# Patient Record
Sex: MALE | Race: BLACK OR AFRICAN AMERICAN | NOT HISPANIC OR LATINO | Employment: FULL TIME | URBAN - METROPOLITAN AREA
[De-identification: names, ages, dates, MRNs, and addresses within clinical notes are randomized per-mention and may not be internally consistent; named-entity substitution may affect disease eponyms.]

---

## 2017-06-21 ENCOUNTER — HOSPITAL ENCOUNTER (OUTPATIENT)
Dept: MRI IMAGING | Facility: CLINIC | Age: 22
Discharge: HOME | End: 2017-06-21
Payer: COMMERCIAL

## 2017-06-21 DIAGNOSIS — G93.0 CEREBRAL CYSTS: ICD-10-CM

## 2017-06-21 PROCEDURE — A9585 GADOBUTROL INJECTION: HCPCS | Performed by: PSYCHIATRY & NEUROLOGY

## 2017-06-21 PROCEDURE — 70553 MRI BRAIN STEM W/O & W/DYE: CPT

## 2017-06-21 RX ADMIN — GADOBUTROL 8 ML: 604.72 INJECTION INTRAVENOUS at 08:54

## 2018-01-15 NOTE — RESULT NOTES
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  - Patient Instructions: To schedule this appointment, please contact Central Scheduling at [phone]  Test Name Result Flag Reference   MRI BRAIN W WO CONTRAST (Report)     MRI BRAIN WITH AND WITHOUT CONTRAST     INDICATION: Neuroepithelial cyst, history of concussion, headache, surveillance     COMPARISON: MR 3/6/2015     TECHNIQUE:   Sagittal T1, axial T2, axial FLAIR, axial T1, axial [place],    Sagittal, axial and coronal T1 postcontrast  Axial BRAVO post contrast     8 mL of Gadavist was injected intravenously without immediate consequence  IMAGE QUALITY:  Diagnostic  FINDINGS:     BRAIN PARENCHYMA: Stable small right colloidal fissure cyst, with concordant signal along long and short TR imaging  No edema/gliosis or enhancement  No ventricular obstruction, no change in size of this lesion since prior study  A focus extends up    to 13 mm in greatest linear dimension  No acute disease  Postcontrast imaging of the brain demonstrates no abnormal enhancement  VENTRICLES: Normal      SELLA AND PITUITARY GLAND: Normal      ORBITS: Normal      PARANASAL SINUSES: Normal      VASCULATURE: Evaluation of the major intracranial vasculature demonstrates appropriate flow voids  CALVARIUM AND SKULL BASE: Normal      EXTRACRANIAL SOFT TISSUES: Normal        IMPRESSION:     Stable MR appearance the brain  No acute disease  The small right choroidal fissure cyst is stable  Workstation performed: [id number]     Signed by:    Joseline Crespo MD   2/15/16

## 2019-07-28 ENCOUNTER — HOSPITAL ENCOUNTER (EMERGENCY)
Facility: HOSPITAL | Age: 24
Discharge: HOME | End: 2019-07-28
Attending: EMERGENCY MEDICINE
Payer: COMMERCIAL

## 2019-07-28 ENCOUNTER — APPOINTMENT (EMERGENCY)
Dept: RADIOLOGY | Facility: HOSPITAL | Age: 24
End: 2019-07-28
Payer: COMMERCIAL

## 2019-07-28 VITALS
OXYGEN SATURATION: 95 % | TEMPERATURE: 98.6 F | DIASTOLIC BLOOD PRESSURE: 57 MMHG | SYSTOLIC BLOOD PRESSURE: 115 MMHG | RESPIRATION RATE: 18 BRPM | BODY MASS INDEX: 25.75 KG/M2 | WEIGHT: 179.9 LBS | HEART RATE: 53 BPM | HEIGHT: 70 IN

## 2019-07-28 DIAGNOSIS — S46.001A ROTATOR CUFF INJURY, RIGHT, INITIAL ENCOUNTER: Primary | ICD-10-CM

## 2019-07-28 PROCEDURE — 99283 EMERGENCY DEPT VISIT LOW MDM: CPT | Performed by: EMERGENCY MEDICINE

## 2019-07-28 PROCEDURE — 99283 EMERGENCY DEPT VISIT LOW MDM: CPT

## 2019-07-28 PROCEDURE — 73030 X-RAY EXAM OF SHOULDER: CPT

## 2019-07-28 NOTE — ED PROVIDER NOTES
History  Chief Complaint   Patient presents with    Shoulder Injury     pt c/o injuring his right shoulder in april while playing football  c/o still having pain     HPI patient is a 23-year-old male, reports he was injured in a football game in April  Patient reports being tackled and landing on his right shoulder  Patient reports today he was moving his shoulder and felt sudden onset of pain  He reports he still having pain in the area  He complains of pain with range of motion  He reports pain when he tries to lift his right arm above his head  Patient denies any neck pain  Denies any headache  He denies any focal weakness  Denies any numbness or decreased function in his right arm  Past medical history previously healthy  Family history noncontributory  Social history, nonsmoker no history of drug abuse  None       History reviewed  No pertinent past medical history  History reviewed  No pertinent surgical history  History reviewed  No pertinent family history  I have reviewed and agree with the history as documented  Social History     Tobacco Use    Smoking status: Never Smoker    Smokeless tobacco: Never Used   Substance Use Topics    Alcohol use: Not Currently     Frequency: Never    Drug use: Not Currently        Review of Systems   Constitutional: Negative for fever  HENT: Negative for congestion  Eyes: Negative for pain and redness  Respiratory: Negative for cough and shortness of breath  Cardiovascular: Negative for chest pain  Gastrointestinal: Negative for abdominal pain and vomiting  Right shoulder pain    Physical Exam  Physical Exam   Constitutional: He is oriented to person, place, and time  He appears well-developed and well-nourished  HENT:   Head: Normocephalic  Right Ear: External ear normal    Left Ear: External ear normal    Nose: Nose normal    Mouth/Throat: Oropharynx is clear and moist    Eyes: Pupils are equal, round, and reactive to light  EOM and lids are normal    Neck: Normal range of motion  Neck supple  Pulmonary/Chest: Effort normal  No respiratory distress  Musculoskeletal: He exhibits no deformity  There is no deformity of the right shoulder there is no tenderness at the acromioclavicular joint there is no sign of dislocation  There is tenderness around the rotator cuff, patient has abduction but only to 90°, he cannot bring his arm over his head  Good distal pulses and sensation neurovascular tendon intact  Good capillary refill  Neurological: He is alert and oriented to person, place, and time  Skin: Skin is warm and dry  Psychiatric: He has a normal mood and affect  Nursing note and vitals reviewed  Vital Signs  ED Triage Vitals [07/28/19 1651]   Temperature Pulse Respirations Blood Pressure SpO2   98 6 °F (37 °C) (!) 53 18 115/57 95 %      Temp Source Heart Rate Source Patient Position - Orthostatic VS BP Location FiO2 (%)   Oral Monitor Sitting Left arm --      Pain Score       --           Vitals:    07/28/19 1651   BP: 115/57   Pulse: (!) 53   Patient Position - Orthostatic VS: Sitting         Visual Acuity      ED Medications  Medications - No data to display    Diagnostic Studies  Results Reviewed     None                 XR shoulder 2+ vw right   Final Result by Nel Villasenor MD (07/28 1838)      No acute osseous abnormality  Workstation performed: [id number]                    Procedures  Procedures       ED Course              x-ray the right shoulder shows no fracture no dislocation no bony lesions, interpreted by me I was initial   MDM medical decision making 69-year-old male presents emergency department with right shoulder pain, intermittent pain since April and then now increasing pain over the last 24 hours  Patient has tenderness over his rotator cuff, no sign of fracture on x-ray  Mechanism consistent with rotator cuff injury    Discussed outpatient treatment and follow-up discussed indications to return  Disposition  Final diagnoses:   Rotator cuff injury, right, initial encounter     Time reflects when diagnosis was documented in both MDM as applicable and the Disposition within this note     Time User Action Codes Description Comment    7/28/2019  5:27 PM Ashley Young Add [S46 001A] Rotator cuff injury, right, initial encounter       ED Disposition     ED Disposition Condition Date/Time Comment    Discharge Stable Sun Jul 28, 2019  5:27 PM Solo Amado IV discharge to home/self care  Follow-up Information     Follow up With Specialties Details Why Contact Info    [facility], DO Sports Medicine   [address] [id number] [phone]            There are no discharge medications for this patient  No discharge procedures on file      ED Provider  Electronically Signed by           Geoff Vizcarra MD  07/29/19 2585

## 2019-07-31 VITALS
WEIGHT: 177 LBS | HEART RATE: 50 BPM | HEIGHT: 70 IN | BODY MASS INDEX: 25.34 KG/M2 | SYSTOLIC BLOOD PRESSURE: 107 MMHG | DIASTOLIC BLOOD PRESSURE: 69 MMHG

## 2019-07-31 DIAGNOSIS — M25.811 CLICKING RIGHT SHOULDER: ICD-10-CM

## 2019-07-31 DIAGNOSIS — S49.91XA INJURY OF RIGHT SHOULDER, INITIAL ENCOUNTER: Primary | ICD-10-CM

## 2019-07-31 PROCEDURE — 99203 OFFICE O/P NEW LOW 30 MIN: CPT | Performed by: FAMILY MEDICINE

## 2019-07-31 NOTE — PROGRESS NOTES
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 and clicking after multiple injuries more than 3 months duration  Discussed with patient physical exam, radiographs, impression, plan  X-rays of the right shoulder are unremarkable for acute osseous abnormality  Physical exam is noted for mild tenderness biceps tendon and of trapezius on the right  He has range of motion of the shoulder limited to forward flexion of 150°, abduction 120°, and internal rotation to lumbar spine  He has normal strength of the shoulder  There is positive Russ maneuver, positive Hopkins's, positive apprehension, and positive axial load and grind  Based on his repetitive mechanism of injury and symptoms of pain and clicking, there is clinical suspicion for injury to the labrum  At this time I will refer him for MRI arthrogram to evaluate for soft tissue abnormality, as surgical intervention may be warranted  He will follow up with me after getting MRI done  Subjective:   Patient ID: Blaise Ventura is a 24 y o  male  Chief Complaint   Patient presents with    Right Shoulder - Pain       26-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 for evaluation of right shoulder pain and clicking more than 3 months duration  He reports that during a football game he abducted and extended his arm in attempt to throw the football, when he was tackled causing his arm to hyper abduct and extend with external rotation    He had pain does described as sudden onset, generalized to the shoulder worse at the lateral posterior aspect, severe intensity, radiating distally to the hand, worse with movement of the arm, associated clicking, and improved with rest   He rested and applied ice  Since then he has had persistence of pain and clicking in the shoulder worse with activity such as elevating the arm and lifting objects  Four days ago do another football game he was tackled to the arm causing exacerbation of his symptoms  He presented to the emergency room where x-ray evaluation was unremarkable for acute osseous abnormality and was suspicion for soft tissue injury  He was advised on icing and anti-inflammatories, and referred to orthopedic care  Shoulder Pain   This is a new problem  The current episode started more than 1 month ago  The problem occurs constantly  The problem has been gradually worsening  Associated symptoms include arthralgias and numbness  Pertinent negatives include no abdominal pain, chest pain, chills, fever, joint swelling, rash, sore throat or weakness  Exacerbated by: Arm use  He has tried rest, ice and NSAIDs for the symptoms  The treatment provided no relief  The following portions of the patient's history were reviewed and updated as appropriate: He  has no past medical history on file  He  has no past surgical history on file  His family history includes No Known Problems in his father and mother  He  reports that he has never smoked  He has never used smokeless tobacco  He reports that he drank alcohol  He reports that he has current or past drug history  He has No Known Allergies       Review of Systems   Constitutional: Negative for chills and fever  HENT: Negative for sore throat  Eyes: Negative for visual disturbance  Respiratory: Negative for shortness of breath  Cardiovascular: Negative for chest pain  Gastrointestinal: Negative for abdominal pain  Genitourinary: Negative for flank pain  Musculoskeletal: Positive for arthralgias  Negative for joint swelling     Skin: Negative for rash and wound  Neurological: Positive for numbness  Negative for weakness  Hematological: Does not bruise/bleed easily  Psychiatric/Behavioral: Negative for self-injury  Objective:  Vitals:    07/31/19 1032   BP: 107/69   Pulse: (!) 50   Weight: 80 3 kg (177 lb)   Height: 5' 10" (1 778 m)     Back Exam     Reflexes   Biceps: normal    Comments:    Cervical spine  -no tenderness  -normal range of motion  -negative Spurling's      Right Hand Exam     Muscle Strength   Wrist extension: 5/5   : 5/5       Left Hand Exam     Muscle Strength   Wrist extension: 5/5   :  5/5       Right Elbow Exam     Comments:  5/5 strength flexion and extension      Left Elbow Exam     Comments:  5/5 strength flexion and extension      Right Shoulder Exam     Tenderness   The patient is experiencing tenderness in the biceps tendon (Lateral subacromial, trapezius)  Range of Motion   Active abduction: 120   Forward flexion: 150   Internal rotation 0 degrees: Lumbar     Muscle Strength   Abduction: 5/5   External rotation: 5/5   Supraspinatus: 5/5   Subscapularis: 5/5   Biceps: 5/5     Tests   Apprehension: positive  Russ test: positive    Comments:  Positive [place]'s  Positive axial grind  Negative belly press  Negative push-off      Left Shoulder Exam     Muscle Strength   Abduction: 5/5           Strength/Myotome Testing     Left Wrist/Hand   Wrist extension: 5    Right Wrist/Hand   Wrist extension: 5      Physical Exam   Constitutional: He is oriented to person, place, and time  He appears well-developed  No distress  HENT:   Head: Normocephalic and atraumatic  Eyes: Conjunctivae are normal    Neck: No tracheal deviation present  Cardiovascular: Normal rate  Pulmonary/Chest: Effort normal  No respiratory distress  Abdominal: He exhibits no distension  Neurological: He is alert and oriented to person, place, and time  Skin: Skin is warm and dry  Psychiatric: He has a normal mood and affect   His behavior is normal    Nursing note and vitals reviewed  I have personally reviewed pertinent films in PACS and my interpretation is No osseous abnormality of right shoulder

## 2019-07-31 NOTE — LETTER
July 31, 2019     Leonila Velez MD  [address] [facility]    Patient: Odette Sifuentes   YOB: 1995   Date of Visit: 7/31/2019       Dear Dr Nicolas Castellanos: Thank you for referring Ja Diaz IV to me for evaluation  Below are my notes for this consultation  If you have questions, please do not hesitate to call me  I look forward to following your patient along with you  Sincerely,        [facility], DO        CC: No Recipients  [facility], DO  7/31/2019  2:32 PM  Sign at close encounter  Assessment/Plan:  Assessment/Plan   Diagnoses and all orders for this visit:    Injury of right shoulder, initial encounter  -     MRI arthrogram right shoulder; Future  -     FL injection right shoulder (arthrogram); Future    Clicking right shoulder  -     MRI arthrogram right shoulder; Future  -     FL injection right shoulder (arthrogram); Future      13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 and clicking after multiple injuries more than 3 months duration  Discussed with patient physical exam, radiographs, impression, plan  X-rays of the right shoulder are unremarkable for acute osseous abnormality  Physical exam is noted for mild tenderness biceps tendon and of trapezius on the right  He has range of motion of the shoulder limited to forward flexion of 150°, abduction 120°, and internal rotation to lumbar spine  He has normal strength of the shoulder  There is positive Russ maneuver, positive Geary's, positive apprehension, and positive axial load and grind  Based on his repetitive mechanism of injury and symptoms of pain and clicking, there is clinical suspicion for injury to the labrum  At this time I will refer him for MRI arthrogram to evaluate for soft tissue abnormality, as surgical intervention may be warranted  He will follow up with me after getting MRI done      Subjective:   Patient ID: Odette Sifuentes is a 24 y o  male  Chief Complaint   Patient presents with    Right Shoulder - Pain       44-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 for evaluation of right shoulder pain and clicking more than 3 months duration  He reports that during a football game he abducted and extended his arm in attempt to throw the football, when he was tackled causing his arm to hyper abduct and extend with external rotation  He had pain does described as sudden onset, generalized to the shoulder worse at the lateral posterior aspect, severe intensity, radiating distally to the hand, worse with movement of the arm, associated clicking, and improved with rest   He rested and applied ice  Since then he has had persistence of pain and clicking in the shoulder worse with activity such as elevating the arm and lifting objects  Four days ago do another football game he was tackled to the arm causing exacerbation of his symptoms  He presented to the emergency room where x-ray evaluation was unremarkable for acute osseous abnormality and was suspicion for soft tissue injury  He was advised on icing and anti-inflammatories, and referred to orthopedic care  Shoulder Pain   This is a new problem  The current episode started more than 1 month ago  The problem occurs constantly  The problem has been gradually worsening  Associated symptoms include arthralgias and numbness  Pertinent negatives include no abdominal pain, chest pain, chills, fever, joint swelling, rash, sore throat or weakness  Exacerbated by: Arm use  He has tried rest, ice and NSAIDs for the symptoms  The treatment provided no relief  The following portions of the patient's history were reviewed and updated as appropriate: He  has no past medical history on file  He  has no past surgical history on file  His family history includes No Known Problems in his father and mother  He  reports that he has never smoked   He has never used smokeless tobacco  He reports that he drank alcohol  He reports that he has current or past drug history  He has No Known Allergies       Review of Systems   Constitutional: Negative for chills and fever  HENT: Negative for sore throat  Eyes: Negative for visual disturbance  Respiratory: Negative for shortness of breath  Cardiovascular: Negative for chest pain  Gastrointestinal: Negative for abdominal pain  Genitourinary: Negative for flank pain  Musculoskeletal: Positive for arthralgias  Negative for joint swelling  Skin: Negative for rash and wound  Neurological: Positive for numbness  Negative for weakness  Hematological: Does not bruise/bleed easily  Psychiatric/Behavioral: Negative for self-injury  Objective:  Vitals:    07/31/19 1032   BP: 107/69   Pulse: (!) 50   Weight: 80 3 kg (177 lb)   Height: 5' 10" (1 778 m)     Back Exam     Reflexes   Biceps: normal    Comments:    Cervical spine  -no tenderness  -normal range of motion  -negative Spurling's      Right Hand Exam     Muscle Strength   Wrist extension: 5/5   : 5/5       Left Hand Exam     Muscle Strength   Wrist extension: 5/5   :  5/5       Right Elbow Exam     Comments:  5/5 strength flexion and extension      Left Elbow Exam     Comments:  5/5 strength flexion and extension      Right Shoulder Exam     Tenderness   The patient is experiencing tenderness in the biceps tendon (Lateral subacromial, trapezius)      Range of Motion   Active abduction: 120   Forward flexion: 150   Internal rotation 0 degrees: Lumbar     Muscle Strength   Abduction: 5/5   External rotation: 5/5   Supraspinatus: 5/5   Subscapularis: 5/5   Biceps: 5/5     Tests   Apprehension: positive  Russ test: positive    Comments:  Positive [place]'s  Positive axial grind  Negative belly press  Negative push-off      Left Shoulder Exam     Muscle Strength   Abduction: 5/5           Strength/Myotome Testing     Left Wrist/Hand   Wrist extension: 5    Right Wrist/Hand   Wrist extension: 5      Physical Exam   Constitutional: He is oriented to person, place, and time  He appears well-developed  No distress  HENT:   Head: Normocephalic and atraumatic  Eyes: Conjunctivae are normal    Neck: No tracheal deviation present  Cardiovascular: Normal rate  Pulmonary/Chest: Effort normal  No respiratory distress  Abdominal: He exhibits no distension  Neurological: He is alert and oriented to person, place, and time  Skin: Skin is warm and dry  Psychiatric: He has a normal mood and affect  His behavior is normal    Nursing note and vitals reviewed  I have personally reviewed pertinent films in PACS and my interpretation is No osseous abnormality of right shoulder

## 2019-08-05 ENCOUNTER — HOSPITAL ENCOUNTER (OUTPATIENT)
Dept: RADIOLOGY | Facility: HOSPITAL | Age: 24
Discharge: HOME | End: 2019-08-05
Attending: FAMILY MEDICINE
Payer: COMMERCIAL

## 2019-08-05 ENCOUNTER — HOSPITAL ENCOUNTER (OUTPATIENT)
Dept: MRI IMAGING | Facility: HOSPITAL | Age: 24
Discharge: HOME | End: 2019-08-05
Attending: FAMILY MEDICINE
Payer: COMMERCIAL

## 2019-08-05 DIAGNOSIS — M25.811 CLICKING RIGHT SHOULDER: ICD-10-CM

## 2019-08-05 DIAGNOSIS — S49.91XA INJURY OF RIGHT SHOULDER, INITIAL ENCOUNTER: ICD-10-CM

## 2019-08-05 PROCEDURE — A9585 GADOBUTROL INJECTION: HCPCS | Performed by: RADIOLOGY

## 2019-08-05 PROCEDURE — 77002 NEEDLE LOCALIZATION BY XRAY: CPT

## 2019-08-05 PROCEDURE — 73222 MRI JOINT UPR EXTREM W/DYE: CPT

## 2019-08-05 PROCEDURE — 23350 INJECTION FOR SHOULDER X-RAY: CPT

## 2019-08-05 RX ORDER — 0.9 % SODIUM CHLORIDE 0.9 %
50 VIAL (ML) INJECTION
Status: COMPLETED | OUTPATIENT
Start: 2019-08-05 | End: 2019-08-05

## 2019-08-05 RX ORDER — LIDOCAINE HYDROCHLORIDE 10 MG/ML
15 INJECTION, SOLUTION EPIDURAL; INFILTRATION; INTRACAUDAL; PERINEURAL
Status: COMPLETED | OUTPATIENT
Start: 2019-08-05 | End: 2019-08-05

## 2019-08-05 RX ADMIN — LIDOCAINE HYDROCHLORIDE 8 ML: 10 INJECTION, SOLUTION EPIDURAL; INFILTRATION; INTRACAUDAL; PERINEURAL at 14:50

## 2019-08-05 RX ADMIN — GADOBUTROL 0.2 ML: 604.72 INJECTION INTRAVENOUS at 16:01

## 2019-08-05 RX ADMIN — SODIUM CHLORIDE 12 ML: 9 INJECTION, SOLUTION INTRAMUSCULAR; INTRAVENOUS; SUBCUTANEOUS at 14:50

## 2019-08-05 RX ADMIN — IOHEXOL 3 ML: 300 INJECTION, SOLUTION INTRAVENOUS at 14:50

## 2019-08-07 VITALS
HEIGHT: 70 IN | DIASTOLIC BLOOD PRESSURE: 77 MMHG | SYSTOLIC BLOOD PRESSURE: 123 MMHG | WEIGHT: 177 LBS | HEART RATE: 60 BPM | BODY MASS INDEX: 25.34 KG/M2

## 2019-08-07 DIAGNOSIS — M25.611 SHOULDER STIFFNESS, RIGHT: ICD-10-CM

## 2019-08-07 DIAGNOSIS — Q74.0 BUFORD COMPLEX OF RIGHT SHOULDER: ICD-10-CM

## 2019-08-07 DIAGNOSIS — S43.431D LABRAL TEAR OF SHOULDER, RIGHT, SUBSEQUENT ENCOUNTER: Primary | ICD-10-CM

## 2019-08-07 DIAGNOSIS — M25.811 CLICKING RIGHT SHOULDER: ICD-10-CM

## 2019-08-07 DIAGNOSIS — M25.311 SHOULDER INSTABILITY, RIGHT: ICD-10-CM

## 2019-08-07 PROCEDURE — 99213 OFFICE O/P EST LOW 20 MIN: CPT | Performed by: FAMILY MEDICINE

## 2019-08-07 NOTE — PROGRESS NOTES
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 and clicking after multiple injuries more than 3 months duration  Discussed with patient MRI results, impression and plan MRI arthrogram of the right shoulder noted for small posterior superior glenoid labral tear, and absent anterior superior labrum and hypertrophied middle glenohumeral ligament consistent with [place] complex  Today he has range of motion with forward flexion of 170°, abduction 160°, and internal rotation to the lumbar spine  I discussed with patient that based on the morphology of his injury and variant of a shoulder surgical intervention is not recommended at this time, but we may proceed with management of formal physical therapy to which he agreed  I will refer him to physical therapy which he is to start as soon as possible and do home exercises as directed  He may take ibuprofen as needed for pain  He will follow up with me in 6 weeks at which point he will be re-evaluated  Subjective:   Patient ID: Machelle Beard is a 24 y o  male  Chief Complaint   Patient presents with    Right Shoulder - Pain, Follow-up       43-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 following up for right shoulder pain and clicking more than 3 months duration following multiple injuries  He was last seen 1 week ago at which point he was referred for MRI arthrogram of the shoulder    Today reports mild improvement stating he has improved range of motion of the shoulder  He has pain described as generalized to the shoulder but worse at the lateral posterior aspect, fluctuating in intensity, intermittent, radiating distally to the hand, worse with movement of the arm and strenuous activity, associated with clicking, and improved with rest   He has not needed to take medications for pain  He has not had any new injury since his last visit  Shoulder Pain   This is a new problem  The current episode started more than 1 month ago  The problem occurs intermittently  The problem has been gradually improving  Associated symptoms include arthralgias  Pertinent negatives include no joint swelling, numbness or weakness  Exacerbated by: Physical activity  He has tried rest, ice and NSAIDs for the symptoms  The treatment provided mild relief  Review of Systems   Musculoskeletal: Positive for arthralgias  Negative for joint swelling  Neurological: Negative for weakness and numbness  Objective:  Vitals:    08/07/19 1110   BP: 123/77   Pulse: 60   Weight: 80 3 kg (177 lb)   Height: 5' 10" (1 778 m)     Right Shoulder Exam     Range of Motion   Active abduction: 160   Forward flexion: 170   Internal rotation 0 degrees: Lumbar             Physical Exam   Constitutional: He is oriented to person, place, and time  He appears well-developed  No distress  HENT:   Head: Normocephalic and atraumatic  Eyes: Conjunctivae are normal    Neck: No tracheal deviation present  Cardiovascular: Normal rate  Pulmonary/Chest: Effort normal  No respiratory distress  Abdominal: He exhibits no distension  Neurological: He is alert and oriented to person, place, and time  Skin: Skin is warm and dry  Psychiatric: He has a normal mood and affect  His behavior is normal    Nursing note and vitals reviewed        I have personally reviewed pertinent films in PACS and my interpretation is No rotator cuff tear  Small labral tear

## 2019-08-20 ENCOUNTER — EVALUATION (OUTPATIENT)
Dept: PHYSICAL THERAPY | Facility: CLINIC | Age: 24
End: 2019-08-20
Payer: COMMERCIAL

## 2019-08-20 DIAGNOSIS — M25.811 CLICKING RIGHT SHOULDER: ICD-10-CM

## 2019-08-20 DIAGNOSIS — Q74.0 BUFORD COMPLEX OF RIGHT SHOULDER: ICD-10-CM

## 2019-08-20 DIAGNOSIS — M25.611 SHOULDER STIFFNESS, RIGHT: ICD-10-CM

## 2019-08-20 DIAGNOSIS — S43.431D LABRAL TEAR OF SHOULDER, RIGHT, SUBSEQUENT ENCOUNTER: ICD-10-CM

## 2019-08-20 DIAGNOSIS — M25.311 SHOULDER INSTABILITY, RIGHT: ICD-10-CM

## 2019-08-20 DIAGNOSIS — M25.511 ACUTE PAIN OF RIGHT SHOULDER: Primary | ICD-10-CM

## 2019-08-20 PROCEDURE — 97110 THERAPEUTIC EXERCISES: CPT | Performed by: PHYSICAL THERAPIST

## 2019-08-20 PROCEDURE — 97161 PT EVAL LOW COMPLEX 20 MIN: CPT | Performed by: PHYSICAL THERAPIST

## 2019-08-20 PROCEDURE — 97112 NEUROMUSCULAR REEDUCATION: CPT | Performed by: PHYSICAL THERAPIST

## 2019-08-20 NOTE — PROGRESS NOTES
PT Evaluation     Today's date: 2019  Patient name: Obey Swift  : 1995  MRN: [id number]  Referring provider: Ricardo Ward DO  Dx:   Encounter Diagnosis     ICD-10-CM    1  Acute pain of right shoulder M25 511    2  Labral tear of shoulder, right, subsequent encounter S43 431D Ambulatory referral to Physical Therapy   3  [place] complex of right shoulder Q74 0 Ambulatory referral to Physical Therapy   4  Shoulder instability, right M25 311 Ambulatory referral to Physical Therapy   5  Clicking right shoulder M25 811 Ambulatory referral to Physical Therapy   6  Shoulder stiffness, right M25 611 Ambulatory referral to Physical Therapy       Start Time: 1600  Stop Time: 1700  Total time in clinic (min): 60 minutes    Assessment  Assessment details: Pt is a 24 y o  Male with R shoulder pain following a hit during football one month ago  He is limited in his R shoulder ROM, limited scapular stabilizer strength and R protracted scapula  He has signs and symptoms suggestive of scapular dyskinesis     Impairments: abnormal or restricted ROM, impaired physical strength, pain with function, scapular dyskinesis and poor posture     Symptom irritability: low  Goals  4 weeks  Full R shoulder ROM  Decrease verbal pain rating with R shoulder abduction by 50%  8 weeks  Increase scapular stabilizer muscle strength to symmetrical with L side  Be able to throw a football 100 feet without increase in pain of the R shoulder    Plan  Patient would benefit from: skilled physical therapy  Planned modality interventions: cryotherapy and ultrasound  Planned therapy interventions: abdominal trunk stabilization, functional ROM exercises, home exercise program, therapeutic exercise, therapeutic activities, stretching, strengthening, patient education, manual therapy, postural training and flexibility  Frequency: 2x week  Duration in weeks: 8  Treatment plan discussed with: patient        Subjective Evaluation    History of Present Illness  Date of onset: 2019  Mechanism of injury: trauma  Mechanism of injury: Irritated it playing football than one month ago he was hit while playing and felt a pop in his R shoulder  Quality of life: good    Pain  Current pain ratin  At best pain ratin  At worst pain ratin  Location: R shoulder  Quality: sharp  Relieving factors: rest  Aggravating factors: overhead activity      Diagnostic Tests  MRI studies: abnormal  Treatments  No previous or current treatments  Patient Goals  Patient goals for therapy: decreased pain, return to sport/leisure activities and increased motion          Objective     Postural Observations  Seated posture: fair  Standing posture: fair  Correction of posture: has no consistent effect    Additional Postural Observation Details  Protracted R scapula, decreased lumbar lordosis and thoracic kyphosis    Palpation     Right   No palpable tenderness to the biceps  Muscle spasm in the upper trapezius       Cervical/Thoracic Screen   Cervical range of motion within normal limits    Neurological Testing     Sensation     Shoulder   Left Shoulder   Intact: light touch    Right Shoulder   Intact: light touch    Active Range of Motion   Left Shoulder   Normal active range of motion  Flexion: 160 degrees     Right Shoulder   Flexion: 160 degrees   Abduction: 90 degrees   External rotation 90°: 10 degreeswith pain  Internal rotation BTB: WFL    Passive Range of Motion   Left Shoulder   Normal passive range of motion    Right Shoulder   Flexion: WFL  Abduction: 150 degrees   External rotation 90°: WFL  Internal rotation 90°: WFL    Scapular Mobility   Left Shoulder   Scapular Dyskinesis: none    Right Shoulder   Scapular Dyskinesis: grade I  Scapular Mobility with Shoulder to 90° FF   Scapular winging: minimal    Scapular Mobility beyond 90° FF   Scapular winging: minimal    Strength/Myotome Testing     Left Shoulder   Normal muscle strength    Right Shoulder Isolated Muscles   Latissimus: 4-   Lower trapezius: 4-   Middle trapezius: 4-   Rhomboids: 4-     Additional Strength Details  R gross shoulder strength normal expect those noted above  Tests     Right Shoulder   Positive Hawkin's, Neer's and painful arc  Negative clunk                Precautions: Slight labral tear in R shoulder      Manual              IASTM, MFR, jt mob                                                                     Exercise Diary              Planks   (Elbows -->  High)             Supine ER             Supine IYT             Pulleys             Elliptical pulling             Cross body pull             Wall snow angels             Ball pushups             Ball posture             Ball back ext             Mirant UE/LE             yoga                                                                                                            Modalities

## 2019-08-20 NOTE — LETTER
2019    Aki Dean DO  [address]    Patient: Ami Malik IV   YOB: 1995   Date of Visit: 2019     Encounter Diagnosis     ICD-10-CM    1  Acute pain of right shoulder M25 511    2  Labral tear of shoulder, right, subsequent encounter S43 431D Ambulatory referral to Physical Therapy   3  [place] complex of right shoulder Q74 0 Ambulatory referral to Physical Therapy   4  Shoulder instability, right M25 311 Ambulatory referral to Physical Therapy   5  Clicking right shoulder M25 811 Ambulatory referral to Physical Therapy   6  Shoulder stiffness, right M25 611 Ambulatory referral to Physical Therapy       Dear Dr Zeenat Tejeda: Thank you for your recent referral of Ami Malik IV  Please review the attached evaluation summary from Deven's recent visit  Please verify that you agree with the plan of care by signing the attached order  If you have any questions or concerns, please do not hesitate to call  I sincerely appreciate the opportunity to share in the care of one of your patients and hope to have another opportunity to work with you in the near future  Sincerely,    Dana Fall, PT      Referring Provider:      I certify that I have read the below Plan of Care and certify the need for these services furnished under this plan of treatment while under my care  Aki Dean DO  [address] [address]  [place]          PT Evaluation     Today's date: 2019  Patient name: Rhonda Mares  : 1995  MRN: [id number]  Referring provider: Marnie Haney DO  Dx:   Encounter Diagnosis     ICD-10-CM    1  Acute pain of right shoulder M25 511    2  Labral tear of shoulder, right, subsequent encounter S43 431D Ambulatory referral to Physical Therapy   3  [place] complex of right shoulder Q74 0 Ambulatory referral to Physical Therapy   4   Shoulder instability, right M25 311 Ambulatory referral to Physical Therapy   5  Clicking right shoulder M25 811 Ambulatory referral to Physical Therapy   6  Shoulder stiffness, right M25 611 Ambulatory referral to Physical Therapy       Start Time: 1600  Stop Time: 1700  Total time in clinic (min): 60 minutes    Assessment  Assessment details: Pt is a 24 y o  Male with R shoulder pain following a hit during football one month ago  He is limited in his R shoulder ROM, limited scapular stabilizer strength and R protracted scapula  He has signs and symptoms suggestive of scapular dyskinesis  Impairments: abnormal or restricted ROM, impaired physical strength, pain with function, scapular dyskinesis and poor posture     Symptom irritability: low  Goals  4 weeks  Full R shoulder ROM  Decrease verbal pain rating with R shoulder abduction by 50%  8 weeks  Increase scapular stabilizer muscle strength to symmetrical with L side  Be able to throw a football 100 feet without increase in pain of the R shoulder    Plan  Patient would benefit from: skilled physical therapy  Planned modality interventions: cryotherapy and ultrasound  Planned therapy interventions: abdominal trunk stabilization, functional ROM exercises, home exercise program, therapeutic exercise, therapeutic activities, stretching, strengthening, patient education, manual therapy, postural training and flexibility  Frequency: 2x week  Duration in weeks: 8  Treatment plan discussed with: patient        Subjective Evaluation    History of Present Illness  Date of onset: 2019  Mechanism of injury: trauma  Mechanism of injury: Irritated it playing football than one month ago he was hit while playing and felt a pop in his R shoulder     Quality of life: good    Pain  Current pain ratin  At best pain ratin  At worst pain ratin  Location: R shoulder  Quality: sharp  Relieving factors: rest  Aggravating factors: overhead activity      Diagnostic Tests  MRI studies: abnormal  Treatments  No previous or current treatments  Patient Goals  Patient goals for therapy: decreased pain, return to sport/leisure activities and increased motion          Objective     Postural Observations  Seated posture: fair  Standing posture: fair  Correction of posture: has no consistent effect    Additional Postural Observation Details  Protracted R scapula, decreased lumbar lordosis and thoracic kyphosis    Palpation     Right   No palpable tenderness to the biceps  Muscle spasm in the upper trapezius  Cervical/Thoracic Screen   Cervical range of motion within normal limits    Neurological Testing     Sensation     Shoulder   Left Shoulder   Intact: light touch    Right Shoulder   Intact: light touch    Active Range of Motion   Left Shoulder   Normal active range of motion  Flexion: 160 degrees     Right Shoulder   Flexion: 160 degrees   Abduction: 90 degrees   External rotation 90°:  10 degreeswith pain  Internal rotation BTB: WFL    Passive Range of Motion   Left Shoulder   Normal passive range of motion    Right Shoulder   Flexion: WFL  Abduction: 150 degrees   External rotation 90°:  WFL  Internal rotation 90°:  WFL    Scapular Mobility   Left Shoulder   Scapular Dyskinesis: none    Right Shoulder   Scapular Dyskinesis: grade I  Scapular Mobility with Shoulder to 90° FF   Scapular winging: minimal    Scapular Mobility beyond 90° FF   Scapular winging: minimal    Strength/Myotome Testing     Left Shoulder   Normal muscle strength    Right Shoulder     Isolated Muscles   Latissimus: 4-   Lower trapezius: 4-   Middle trapezius: 4-   Rhomboids: 4-     Additional Strength Details  R gross shoulder strength normal expect those noted above  Tests     Right Shoulder   Positive Hawkin's, Neer's and painful arc  Negative clunk                Precautions: Slight labral tear in R shoulder      Manual              IASTM, MFR, jt mob Exercise Diary              Planks   (Elbows -->  High)             Supine ER             Supine IYT             Pulleys             Elliptical pulling             Cross body pull             Wall snow angels             Ball pushups             Ball posture             Ball back ext             Mirant UE/LE             yoga                                                                                                            Modalities

## 2019-08-20 NOTE — LETTER
2019    Bishop Ivory DO  [address]    Patient: Darylene Jack IV   YOB: 1995   Date of Visit: 2019     Encounter Diagnosis     ICD-10-CM    1  Acute pain of right shoulder M25 511    2  Labral tear of shoulder, right, subsequent encounter S43 431D Ambulatory referral to Physical Therapy   3  [place] complex of right shoulder Q74 0 Ambulatory referral to Physical Therapy   4  Shoulder instability, right M25 311 Ambulatory referral to Physical Therapy   5  Clicking right shoulder M25 811 Ambulatory referral to Physical Therapy   6  Shoulder stiffness, right M25 611 Ambulatory referral to Physical Therapy       Dear Dr Regino Rios: Thank you for your recent referral of Darylene Jack IV  Please review the attached evaluation summary from Deven's recent visit  Please verify that you agree with the plan of care by signing the attached order  If you have any questions or concerns, please do not hesitate to call  I sincerely appreciate the opportunity to share in the care of one of your patients and hope to have another opportunity to work with you in the near future  Sincerely,    Sharlyn Mortimer, PT      Referring Provider:      I certify that I have read the below Plan of Care and certify the need for these services furnished under this plan of treatment while under my care  Bishop Ivory DO  [address] [address]  [place]          PT Evaluation     Today's date: 2019  Patient name: Blaise Ventura  : 1995  MRN: [id number]  Referring provider: Aleta Shipman DO  Dx:   Encounter Diagnosis     ICD-10-CM    1  Acute pain of right shoulder M25 511    2  Labral tear of shoulder, right, subsequent encounter S43 431D Ambulatory referral to Physical Therapy   3  [place] complex of right shoulder Q74 0 Ambulatory referral to Physical Therapy   4   Shoulder instability, right M25 311 Ambulatory referral to Physical Therapy   5  Clicking right shoulder M25 811 Ambulatory referral to Physical Therapy   6  Shoulder stiffness, right M25 611 Ambulatory referral to Physical Therapy       Start Time: 1600  Stop Time: 1700  Total time in clinic (min): 60 minutes    Assessment  Assessment details: Pt is a 24 y o  Male with R shoulder pain following a hit during football one month ago  He is limited in his R shoulder ROM, limited scapular stabilizer strength and R protracted scapula  He has signs and symptoms suggestive of scapular dyskinesis  Impairments: abnormal or restricted ROM, impaired physical strength, pain with function, scapular dyskinesis and poor posture     Symptom irritability: low  Goals  4 weeks  Full R shoulder ROM  Decrease verbal pain rating with R shoulder abduction by 50%  8 weeks  Increase scapular stabilizer muscle strength to symmetrical with L side  Be able to throw a football 100 feet without increase in pain of the R shoulder    Plan  Patient would benefit from: skilled physical therapy  Planned modality interventions: cryotherapy and ultrasound  Planned therapy interventions: abdominal trunk stabilization, functional ROM exercises, home exercise program, therapeutic exercise, therapeutic activities, stretching, strengthening, patient education, manual therapy, postural training and flexibility  Frequency: 2x week  Duration in weeks: 8  Treatment plan discussed with: patient        Subjective Evaluation    History of Present Illness  Date of onset: 2019  Mechanism of injury: trauma  Mechanism of injury: Irritated it playing football than one month ago he was hit while playing and felt a pop in his R shoulder     Quality of life: good    Pain  Current pain ratin  At best pain ratin  At worst pain ratin  Location: R shoulder  Quality: sharp  Relieving factors: rest  Aggravating factors: overhead activity      Diagnostic Tests  MRI studies: abnormal  Treatments  No previous or current treatments  Patient Goals  Patient goals for therapy: decreased pain, return to sport/leisure activities and increased motion          Objective     Postural Observations  Seated posture: fair  Standing posture: fair  Correction of posture: has no consistent effect    Additional Postural Observation Details  Protracted R scapula, decreased lumbar lordosis and thoracic kyphosis    Palpation     Right   No palpable tenderness to the biceps  Muscle spasm in the upper trapezius  Cervical/Thoracic Screen   Cervical range of motion within normal limits    Neurological Testing     Sensation     Shoulder   Left Shoulder   Intact: light touch    Right Shoulder   Intact: light touch    Active Range of Motion   Left Shoulder   Normal active range of motion  Flexion: 160 degrees     Right Shoulder   Flexion: 160 degrees   Abduction: 90 degrees   External rotation 90°:  10 degreeswith pain  Internal rotation BTB: WFL    Passive Range of Motion   Left Shoulder   Normal passive range of motion    Right Shoulder   Flexion: WFL  Abduction: 150 degrees   External rotation 90°:  WFL  Internal rotation 90°:  WFL    Scapular Mobility   Left Shoulder   Scapular Dyskinesis: none    Right Shoulder   Scapular Dyskinesis: grade I  Scapular Mobility with Shoulder to 90° FF   Scapular winging: minimal    Scapular Mobility beyond 90° FF   Scapular winging: minimal    Strength/Myotome Testing     Left Shoulder   Normal muscle strength    Right Shoulder     Isolated Muscles   Latissimus: 4-   Lower trapezius: 4-   Middle trapezius: 4-   Rhomboids: 4-     Additional Strength Details  R gross shoulder strength normal expect those noted above  Tests     Right Shoulder   Positive Hawkin's, Neer's and painful arc  Negative clunk                Precautions: Slight labral tear in R shoulder      Manual Exercise Diary                                                                                                                                                                                                                                                                                      Modalities

## 2019-08-22 ENCOUNTER — OFFICE VISIT (OUTPATIENT)
Dept: PHYSICAL THERAPY | Facility: CLINIC | Age: 24
End: 2019-08-22
Payer: COMMERCIAL

## 2019-08-22 DIAGNOSIS — M25.811 CLICKING RIGHT SHOULDER: ICD-10-CM

## 2019-08-22 DIAGNOSIS — M25.311 SHOULDER INSTABILITY, RIGHT: ICD-10-CM

## 2019-08-22 DIAGNOSIS — S43.431D LABRAL TEAR OF SHOULDER, RIGHT, SUBSEQUENT ENCOUNTER: ICD-10-CM

## 2019-08-22 DIAGNOSIS — M25.511 ACUTE PAIN OF RIGHT SHOULDER: Primary | ICD-10-CM

## 2019-08-22 DIAGNOSIS — Q74.0 BUFORD COMPLEX OF RIGHT SHOULDER: ICD-10-CM

## 2019-08-22 DIAGNOSIS — M25.611 SHOULDER STIFFNESS, RIGHT: ICD-10-CM

## 2019-08-22 PROCEDURE — 97110 THERAPEUTIC EXERCISES: CPT | Performed by: PHYSICAL THERAPIST

## 2019-08-22 PROCEDURE — 97140 MANUAL THERAPY 1/> REGIONS: CPT | Performed by: PHYSICAL THERAPIST

## 2019-08-22 PROCEDURE — 97112 NEUROMUSCULAR REEDUCATION: CPT | Performed by: PHYSICAL THERAPIST

## 2019-08-22 NOTE — PROGRESS NOTES
Daily Note     Today's date: 2019  Patient name: Emmett Vu  : 1995  MRN: [id number]  Referring provider: Katelyn Shah DO  Dx:   Encounter Diagnosis     ICD-10-CM    1  Acute pain of right shoulder M25 511    2  Labral tear of shoulder, right, subsequent encounter S43 431D    3  [place] complex of right shoulder Q74 0    4  Shoulder instability, right M25 311    5  Clicking right shoulder M25 811    6  Shoulder stiffness, right M25 611        Start Time: 1600  Stop Time: 1650  Total time in clinic (min): 50 minutes    Subjective: Pt reports at start of session no pain in the shoulder  Objective: See treatment diary below      Assessment: Pt tolerated most exercises today with minimal increase in shoulder pain  He was unable to complete low plank on elbows due to pain in the shoulder after one repetition  He was able to maintain his posture and scapular position throughout exercises today with minimal verbal and tactile cueing  He is highly motivated to return to training for football and understands the limitations given to him currently as well as the importance with compliance with HEP in order to return to sport  He continues to need PT to increase R shoulder ROM and increase scapular stabilizer muscle strength in order to decrease the shoulder pain for return to throwing in football  Plan: Continue per plan of care  Progress treatment as tolerated  Precautions: Slight labral tear in R shoulder      Manual              IASTM, MFR, jt mob 10'                                                                    Exercise Diary              Planks (elbow) Def              Prone ER 2x15 2#            Pulleys (flex/abd) 2x20 ea            Elliptical pulling 5'            Wall snow angels 3x30"  HEP            Ball posture 2x20            Pec Minor Stretch Doorway 5x10"            Quad UE/LE 2x20 ea            yoga             Supine IYT             Cross body pull Ball back ext             [employer]

## 2019-08-27 ENCOUNTER — OFFICE VISIT (OUTPATIENT)
Dept: PHYSICAL THERAPY | Facility: CLINIC | Age: 24
End: 2019-08-27
Payer: COMMERCIAL

## 2019-08-27 DIAGNOSIS — M25.511 ACUTE PAIN OF RIGHT SHOULDER: Primary | ICD-10-CM

## 2019-08-27 DIAGNOSIS — M25.611 SHOULDER STIFFNESS, RIGHT: ICD-10-CM

## 2019-08-27 DIAGNOSIS — M25.311 SHOULDER INSTABILITY, RIGHT: ICD-10-CM

## 2019-08-27 DIAGNOSIS — S43.431D LABRAL TEAR OF SHOULDER, RIGHT, SUBSEQUENT ENCOUNTER: ICD-10-CM

## 2019-08-27 DIAGNOSIS — M25.811 CLICKING RIGHT SHOULDER: ICD-10-CM

## 2019-08-27 DIAGNOSIS — Q74.0 BUFORD COMPLEX OF RIGHT SHOULDER: ICD-10-CM

## 2019-08-27 PROCEDURE — 97110 THERAPEUTIC EXERCISES: CPT | Performed by: PHYSICAL THERAPIST

## 2019-08-27 PROCEDURE — 97112 NEUROMUSCULAR REEDUCATION: CPT | Performed by: PHYSICAL THERAPIST

## 2019-08-27 NOTE — PROGRESS NOTES
Daily Note     Today's date: 2019  Patient name: Machelle Beard  : 1995  MRN: [id number]  Referring provider: Angie Faustin DO  Dx:   Encounter Diagnosis     ICD-10-CM    1  Acute pain of right shoulder M25 511    2  Labral tear of shoulder, right, subsequent encounter S43 431D    3  [place] complex of right shoulder Q74 0    4  Shoulder instability, right M25 311    5  Clicking right shoulder M25 811    6  Shoulder stiffness, right M25 611                   Subjective: Pt notes some improvement since last session, reports decreased pain with motion  Objective: See treatment diary below      Assessment: Pt completed exercise with correct technique and demonstrated moderate fatigue after exercise  Tolerated progressions of scapular and shoulder strengthening exercises without reports of increased shoulder pain, but did report minor soreness during RS @ 90 abd that subsided afterwards  Serratus TB punch reproduced some symptoms, second set deferred  Pt demonstrated good motor control during exercise  Pt would benefit from continued PT services to reduce pain and increase level of function  Plan: Continue per plan of care  Progress treatment as tolerated  Precautions: Slight labral tear in R shoulder      Manual             IASTM, MFR, jt mob 10' 5' mobs           RS @ 90 flex  3x30"           RS @ 90 abd, neutral rotation, slighty off table  2x30"                                         Exercise Diary             Planks (elbow) Def              Prone ER 2x15 2# 2#, 2x15           Pulleys (flex/abd) 2x20 ea            Elliptical pulling 5'            Wall snow angels 3x30"  HEP -           Ball posture 2x20            Pec Minor Stretch Doorway 5x10" HEP           Quad UE/LE 2x20 ea            yoga             Prone IYT  2x15, 2#           Cross body pull             Ball back ext             Ball pushups             TB serratus punch  BTB, 15x3"           Iso ER wall slide  3x8x2", BTB           TB IR  W/ step out, BTB 3x12           [place]  3#, 2x20           Ball on wall  ABC 1 UC/LC               Modalities              Ice             Heat

## 2019-09-16 NOTE — PROGRESS NOTES
I have reviewed the notes, assessments, and/or procedures performed by Quentin Bazan, PT  I concur with her documentation of [phone]

## 2020-06-13 ENCOUNTER — APPOINTMENT (EMERGENCY)
Dept: RADIOLOGY | Facility: HOSPITAL | Age: 25
End: 2020-06-13
Payer: COMMERCIAL

## 2020-06-13 ENCOUNTER — HOSPITAL ENCOUNTER (EMERGENCY)
Facility: HOSPITAL | Age: 25
Discharge: HOME | End: 2020-06-13
Attending: EMERGENCY MEDICINE | Admitting: EMERGENCY MEDICINE
Payer: COMMERCIAL

## 2020-06-13 VITALS
BODY MASS INDEX: 26.89 KG/M2 | OXYGEN SATURATION: 98 % | HEART RATE: 57 BPM | DIASTOLIC BLOOD PRESSURE: 65 MMHG | TEMPERATURE: 98.2 F | WEIGHT: 187.39 LBS | SYSTOLIC BLOOD PRESSURE: 121 MMHG | RESPIRATION RATE: 18 BRPM

## 2020-06-13 DIAGNOSIS — S43.491A OTHER SPRAIN OF RIGHT SHOULDER JOINT, INITIAL ENCOUNTER: Primary | ICD-10-CM

## 2020-06-13 PROCEDURE — 99284 EMERGENCY DEPT VISIT MOD MDM: CPT | Performed by: EMERGENCY MEDICINE

## 2020-06-13 PROCEDURE — 99283 EMERGENCY DEPT VISIT LOW MDM: CPT

## 2020-06-13 PROCEDURE — 73030 X-RAY EXAM OF SHOULDER: CPT

## 2020-06-13 PROCEDURE — 96372 THER/PROPH/DIAG INJ SC/IM: CPT

## 2020-06-13 RX ORDER — KETOROLAC TROMETHAMINE 30 MG/ML
15 INJECTION, SOLUTION INTRAMUSCULAR; INTRAVENOUS ONCE
Status: COMPLETED | OUTPATIENT
Start: 2020-06-13 | End: 2020-06-13

## 2020-06-13 RX ORDER — IBUPROFEN 800 MG/1
800 TABLET ORAL 3 TIMES DAILY
Qty: 21 TABLET | Refills: 0 | Status: SHIPPED | OUTPATIENT
Start: 2020-06-13 | End: 2020-09-14 | Stop reason: HOSPADM

## 2020-06-13 RX ADMIN — KETOROLAC TROMETHAMINE 15 MG: 30 INJECTION, SOLUTION INTRAMUSCULAR at 13:58

## 2020-06-22 ENCOUNTER — OFFICE VISIT (OUTPATIENT)
Dept: OBGYN CLINIC | Facility: CLINIC | Age: 25
End: 2020-06-22
Payer: COMMERCIAL

## 2020-06-22 VITALS
DIASTOLIC BLOOD PRESSURE: 68 MMHG | HEART RATE: 58 BPM | HEIGHT: 70 IN | WEIGHT: 186.6 LBS | RESPIRATION RATE: 18 BRPM | BODY MASS INDEX: 26.71 KG/M2 | SYSTOLIC BLOOD PRESSURE: 110 MMHG

## 2020-06-22 DIAGNOSIS — S43.431D LABRAL TEAR OF SHOULDER, RIGHT, SUBSEQUENT ENCOUNTER: Primary | ICD-10-CM

## 2020-06-22 DIAGNOSIS — Q74.0 BUFORD COMPLEX OF RIGHT SHOULDER: ICD-10-CM

## 2020-06-22 DIAGNOSIS — M25.311 SHOULDER INSTABILITY, RIGHT: ICD-10-CM

## 2020-06-22 PROCEDURE — 99213 OFFICE O/P EST LOW 20 MIN: CPT | Performed by: FAMILY MEDICINE

## 2020-06-22 RX ORDER — ALBUTEROL SULFATE 90 UG/1
1 AEROSOL, METERED RESPIRATORY (INHALATION) EVERY 6 HOURS PRN
COMMUNITY

## 2020-07-08 ENCOUNTER — OFFICE VISIT (OUTPATIENT)
Dept: OBGYN CLINIC | Facility: OTHER | Age: 25
End: 2020-07-08
Payer: COMMERCIAL

## 2020-07-08 VITALS
WEIGHT: 186 LBS | BODY MASS INDEX: 26.63 KG/M2 | DIASTOLIC BLOOD PRESSURE: 74 MMHG | HEIGHT: 70 IN | HEART RATE: 45 BPM | SYSTOLIC BLOOD PRESSURE: 121 MMHG

## 2020-07-08 DIAGNOSIS — Q74.0 BUFORD COMPLEX OF RIGHT SHOULDER: ICD-10-CM

## 2020-07-08 DIAGNOSIS — M25.311 SHOULDER INSTABILITY, RIGHT: ICD-10-CM

## 2020-07-08 DIAGNOSIS — S43.431D LABRAL TEAR OF SHOULDER, RIGHT, SUBSEQUENT ENCOUNTER: ICD-10-CM

## 2020-07-08 PROCEDURE — 99214 OFFICE O/P EST MOD 30 MIN: CPT | Performed by: ORTHOPAEDIC SURGERY

## 2020-07-08 NOTE — PROGRESS NOTES
Orthopaedic Surgery - Office Note  Angi Hernandes IV (21 y o  male)   : 1995   MRN: [id number]  Encounter Date: 2020    Chief Complaint   Patient presents with    Right Shoulder - Pain       Assessment / Plan  Right shoulder, glenohumeral labral tear    · MRI Arthrogram of right shoulder  · Activity as tolerated  · At today's appointment we discussed at length non operative versus operative treatment of his right shoulder instability  Due to his anatomical variants he may be appropriate to initially treat non operatively  Return for Recheck after MRI  History of Present Illness  Angi Hernandes IV is a 21 y o  male who presents for initial evaluation of right shoulder pain  He has been experiencing right shoulder pain and instability for approximately 1 year 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  Approximately 1 month ago he had a 2nd injury with a subluxation event  He reports that any activity with his right shoulder increases his pain  He does experience numbness and tingling into his hand since his last injury  On his MRI arthrogram after his initial injury a posterior superior glenoid labral tear was present  He did treat his initial injury non operatively, with formal physical therapy  He denies any further injury or trauma to his right shoulder  Review of Systems  Pertinent items are noted in HPI  All other systems were reviewed and are negative  Physical Exam  /74   Pulse (!) 45   Ht 5' 10" (1 778 m)   Wt 84 4 kg (186 lb)   BMI 26 69 kg/m²   Cons: Appears well  No apparent distress  Psych: Alert  Oriented x3  Mood and affect normal   Eyes: PERRLA, EOMI  Resp: Normal effort  No audible wheezing or stridor  CV: Palpable pulse  No discernable arrhythmia  No LE edema  Lymph:  No palpable cervical, axillary, or inguinal lymphadenopathy  Skin: Warm  No palpable masses  No visible lesions  Neuro: Normal muscle tone    Normal and symmetric DTR's  Right Shoulder Exam  Alignment / Posture:  Normal shoulder posture  Inspection:  No swelling  No edema  No erythema  Palpation:  Moderate tenderness at Anterior shoulder  ROM:  Shoulder FE 90  Shoulder ER 60  Shoulder IR T8  Strength:  5/5 supraspinatus, infraspinatus, and subscapularis  Stability:  (+) Apprehension  (+) Relocation  Tests: (+) Russ  (+) Speed  (+) Christy Harris  Neurovascular:  Sensation intact in Ax/R/M/U nerve distributions  2+ radial pulse  Studies Reviewed  MRI arthrogram of right shoulder - I personally reviewed the MRI arthrogram obtained on August 5, 2019 which demonstrated a posterior superior glenoid labral tear  he also has an anatomical variants of the anterior superior glenoid labrum and a hypertrophied  middle glenohumeral ligament  Procedures  No procedures today  Medical, Surgical, Family, and Social History  The patient's medical history, family history, and social history, were reviewed and updated as appropriate  History reviewed  No pertinent past medical history      Past Surgical History:   Procedure Laterality Date    FL INJECTION RIGHT SHOULDER (ARTHROGRAM)  8/5/2019       Family History   Problem Relation Age of Onset    No Known Problems Mother     No Known Problems Father        Social History     Occupational History    Not on file   Tobacco Use    Smoking status: Never Smoker    Smokeless tobacco: Never Used   Substance and Sexual Activity    Alcohol use: Not Currently     Frequency: Never    Drug use: Not Currently    Sexual activity: Not on file       No Known Allergies      Current Outpatient Medications:     albuterol (PROVENTIL HFA,VENTOLIN HFA) 90 mcg/act inhaler, Inhale 1 puff every 6 (six) hours as needed, Disp: , Rfl:     ibuprofen (MOTRIN) 800 mg tablet, Take 1 tablet (800 mg total) by mouth 3 (three) times a day, Disp: 21 tablet, Rfl: 0      Do Palangio    Scribe Attestation    I,:   Do Palangio am acting as a scribe while in the presence of the attending physician :        I,:   Mora Cruz MD personally performed the services described in this documentation    as scribed in my presence :

## 2020-07-27 ENCOUNTER — TELEPHONE (OUTPATIENT)
Dept: OBGYN CLINIC | Facility: HOSPITAL | Age: 25
End: 2020-07-27

## 2020-07-27 ENCOUNTER — HOSPITAL ENCOUNTER (OUTPATIENT)
Dept: RADIOLOGY | Facility: HOSPITAL | Age: 25
Discharge: HOME | End: 2020-07-27
Attending: ORTHOPAEDIC SURGERY
Payer: COMMERCIAL

## 2020-07-27 ENCOUNTER — HOSPITAL ENCOUNTER (OUTPATIENT)
Dept: MRI IMAGING | Facility: HOSPITAL | Age: 25
Discharge: HOME | End: 2020-07-27
Attending: ORTHOPAEDIC SURGERY
Payer: COMMERCIAL

## 2020-07-27 DIAGNOSIS — Q74.0 BUFORD COMPLEX OF RIGHT SHOULDER: ICD-10-CM

## 2020-07-27 DIAGNOSIS — S43.431D LABRAL TEAR OF SHOULDER, RIGHT, SUBSEQUENT ENCOUNTER: ICD-10-CM

## 2020-07-27 DIAGNOSIS — M25.311 SHOULDER INSTABILITY, RIGHT: ICD-10-CM

## 2020-07-27 DIAGNOSIS — M25.511 RIGHT SHOULDER PAIN, UNSPECIFIED CHRONICITY: Primary | ICD-10-CM

## 2020-07-27 PROCEDURE — 77002 NEEDLE LOCALIZATION BY XRAY: CPT

## 2020-07-27 PROCEDURE — A9585 GADOBUTROL INJECTION: HCPCS | Performed by: ORTHOPAEDIC SURGERY

## 2020-07-27 PROCEDURE — 23350 INJECTION FOR SHOULDER X-RAY: CPT

## 2020-07-27 PROCEDURE — 73222 MRI JOINT UPR EXTREM W/DYE: CPT

## 2020-07-27 RX ORDER — SODIUM CHLORIDE 9 MG/ML
10 INJECTION INTRAVENOUS ONCE
Status: COMPLETED | OUTPATIENT
Start: 2020-07-27 | End: 2020-07-27

## 2020-07-27 RX ORDER — LIDOCAINE HYDROCHLORIDE 10 MG/ML
10 INJECTION, SOLUTION EPIDURAL; INFILTRATION; INTRACAUDAL; PERINEURAL ONCE
Status: COMPLETED | OUTPATIENT
Start: 2020-07-27 | End: 2020-07-27

## 2020-07-27 RX ADMIN — GADOBUTROL 1 ML: 604.72 INJECTION INTRAVENOUS at 13:26

## 2020-07-27 RX ADMIN — IOHEXOL 3 ML: 300 INJECTION, SOLUTION INTRAVENOUS at 13:26

## 2020-07-27 RX ADMIN — SODIUM CHLORIDE 10 ML: 9 INJECTION, SOLUTION INTRAMUSCULAR; INTRAVENOUS; SUBCUTANEOUS at 13:26

## 2020-07-27 RX ADMIN — LIDOCAINE HYDROCHLORIDE 7 ML: 10 INJECTION, SOLUTION EPIDURAL; INFILTRATION; INTRACAUDAL; PERINEURAL at 13:26

## 2020-07-27 NOTE — TELEPHONE ENCOUNTER
Per conversation with Shirley Brasher at office, he will contact Dr Maxine Liu to have order signed for mri   Information relayed to MRI dept, they will proceed with verbal

## 2020-07-28 NOTE — TELEPHONE ENCOUNTER
[place], [place] [address] is calling in reference to orders entered by Home Baker   Call transferred to [facility] at [phone]

## 2020-07-31 ENCOUNTER — OFFICE VISIT (OUTPATIENT)
Dept: OBGYN CLINIC | Facility: MEDICAL CENTER | Age: 25
End: 2020-07-31
Payer: COMMERCIAL

## 2020-07-31 VITALS
HEART RATE: 53 BPM | TEMPERATURE: 97.8 F | DIASTOLIC BLOOD PRESSURE: 74 MMHG | SYSTOLIC BLOOD PRESSURE: 118 MMHG | WEIGHT: 180 LBS | BODY MASS INDEX: 25.77 KG/M2 | HEIGHT: 70 IN

## 2020-07-31 DIAGNOSIS — S43.431A LABRAL TEAR OF SHOULDER, RIGHT, INITIAL ENCOUNTER: Primary | ICD-10-CM

## 2020-07-31 PROCEDURE — 99213 OFFICE O/P EST LOW 20 MIN: CPT | Performed by: ORTHOPAEDIC SURGERY

## 2020-07-31 RX ORDER — CEFAZOLIN SODIUM 2 G/50ML
2000 SOLUTION INTRAVENOUS ONCE
Status: CANCELLED | OUTPATIENT
Start: 2020-09-14 | End: 2020-07-31

## 2020-07-31 NOTE — PROGRESS NOTES
Orthopaedic Surgery - Office Note  Ollie Gilbert (21 y o  male)   : 1995   MRN: [id number]  Encounter Date: 2020    Chief Complaint   Patient presents with    Right Shoulder - Follow-up       Assessment / Plan  Right posterior labral tear, possible anterior labral tear with recurrent instability    · MRI Arthrogram was reviewed with the patient  · Treatment options were reviewed  · I recommended surgical management with right shoulder arthroscopy and labral repair  · Risks and benefits were reviewed  · Consent was signed  Return for F/U 4-5 days postop  History of Present Illness  Betzy Small IV is a 21 y o  male who presents for f/u of right shoulder pain and instability, present for approximately 1 year  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  Approximately 1 month ago he had a 2nd injury with a subluxation event  He reports that any activity with his right shoulder increases his pain  He does experience numbness and tingling into his hand since his last injury  On his MRI arthrogram after his initial injury a posterior superior glenoid labral tear was present  He did treat his initial injury non operatively, with formal physical therapy  He denies any further injury or trauma to his right shoulder  He is here to review his new MRI arthrogram   He reports no interval changes in his symptoms since the last office visit  Review of Systems  Pertinent items are noted in HPI  All other systems were reviewed and are negative  Physical Exam  /74   Pulse (!) 53   Temp 97 8 °F (36 6 °C)   Ht 5' 10" (1 778 m)   Wt 81 6 kg (180 lb)   BMI 25 83 kg/m²   Cons: Appears well  No apparent distress  Psych: Alert  Oriented x3  Mood and affect normal   Eyes: PERRLA, EOMI  Resp: Normal effort  No audible wheezing or stridor  CV: Palpable pulse  No discernable arrhythmia  No LE edema    Lymph:  No palpable cervical, axillary, or inguinal lymphadenopathy  Skin: Warm  No palpable masses  No visible lesions  Neuro: Normal muscle tone  Normal and symmetric DTR's  Right Shoulder Exam  Alignment / Posture:  Normal shoulder posture  Inspection:  No swelling  No edema  No erythema  Palpation:  Moderate tenderness at Anterior shoulder  ROM:  Shoulder FE 90  Shoulder ER 60  Shoulder IR T8  Strength:  5/5 supraspinatus, infraspinatus, and subscapularis  Stability:  (+) Apprehension  (+) Relocation  Tests: (+) Russ  (+) Speed  (+) Sha Douse  Neurovascular:  Sensation intact in Ax/R/M/U nerve distributions  2+ radial pulse  Studies Reviewed  I have personally reviewed pertinent films in PACS  MRI arthrogram of right shoulder - posterior superior labral tear is again noted  on this MRI there is some concern for anterior labral tear as well, although on his last MRI this area appeared to be a normal anatomic variant    Procedures  No procedures today  Medical, Surgical, Family, and Social History  The patient's medical history, family history, and social history, were reviewed and updated as appropriate  History reviewed  No pertinent past medical history      Past Surgical History:   Procedure Laterality Date    FL INJECTION RIGHT SHOULDER (ARTHROGRAM)  8/5/2019    FL INJECTION RIGHT SHOULDER (ARTHROGRAM)  7/27/2020       Family History   Problem Relation Age of Onset    No Known Problems Mother     No Known Problems Father        Social History     Occupational History    Not on file   Tobacco Use    Smoking status: Never Smoker    Smokeless tobacco: Never Used   Substance and Sexual Activity    Alcohol use: Not Currently     Frequency: Never    Drug use: Not Currently    Sexual activity: Not on file       No Known Allergies      Current Outpatient Medications:     albuterol (PROVENTIL HFA,VENTOLIN HFA) 90 mcg/act inhaler, Inhale 1 puff every 6 (six) hours as needed, Disp: , Rfl:     ibuprofen (MOTRIN) 800 mg tablet, Take 1 tablet (800 mg total) by mouth 3 (three) times a day, Disp: 21 tablet, Rfl: 0      Alvarez Valderrama MD    Scribe Attestation    I,:    am acting as a scribe while in the presence of the attending physician :        I,:    personally performed the services described in this documentation    as scribed in my presence :

## 2020-08-28 ENCOUNTER — OFFICE VISIT (OUTPATIENT)
Dept: OBGYN CLINIC | Facility: MEDICAL CENTER | Age: 25
End: 2020-08-28
Payer: COMMERCIAL

## 2020-08-28 VITALS
WEIGHT: 180 LBS | DIASTOLIC BLOOD PRESSURE: 74 MMHG | HEIGHT: 70 IN | SYSTOLIC BLOOD PRESSURE: 122 MMHG | HEART RATE: 47 BPM | BODY MASS INDEX: 25.77 KG/M2 | TEMPERATURE: 97.8 F

## 2020-08-28 DIAGNOSIS — S43.431D LABRAL TEAR OF SHOULDER, RIGHT, SUBSEQUENT ENCOUNTER: Primary | ICD-10-CM

## 2020-08-28 PROCEDURE — 99213 OFFICE O/P EST LOW 20 MIN: CPT | Performed by: ORTHOPAEDIC SURGERY

## 2020-08-28 NOTE — H&P (VIEW-ONLY)
Orthopaedic Surgery - Office Note  Kalyan Krishna IV (21 y o  male)   : 1995   MRN: [id number]  Encounter Date: 2020    Chief Complaint   Patient presents with    Right Shoulder - Follow-up       Assessment / Plan  Right shoulder posterior labral tear, possible anterior labral tear with recurrent instability    · We will proceed with right shoulder arthroscopic labral repair on 2020  · Consent obtained at last visit  · Patient has sling which he will bring with him on the day of surgery  · Patient is to set up  PT prior to surgery  Return for 4-5 days post op  History of Present Illness  Kalyan Krishna IV is a 21 y o  male who presents right shoulder pain and instability  He is here prior to his right shoulder arthroscopic labral repair scheduled on 2020  Patient reports no change in his symptoms since his last visit  He has pain that is worse in the morning and at the end of the day  He experiences pain and popping when reaching across the chest  Patient has had multiple MRI arthrograms (one after initial injury and second on 20) demonstrating posterior labral tear and possible anterior labral tear  He has completed formal physical therapy without consistent improvement  He is taking ibuprofen for pain as needed  Denies new injury  Review of Systems  Pertinent items are noted in HPI  All other systems were reviewed and are negative  Physical Exam  /74   Pulse (!) 47   Temp 97 8 °F (36 6 °C)   Ht 5' 10" (1 778 m)   Wt 81 6 kg (180 lb)   BMI 25 83 kg/m²   Cons: Appears well  No apparent distress  Psych: Alert  Oriented x3  Mood and affect normal   Eyes: PERRLA, EOMI  Resp: Normal effort  No audible wheezing or stridor  CV: Palpable pulse  No discernable arrhythmia  No LE edema  Lymph:  No palpable cervical, axillary, or inguinal lymphadenopathy  Skin: Warm  No palpable masses  No visible lesions  Neuro: Normal muscle tone    Normal and symmetric DTR's      Right Shoulder Exam  Alignment / Posture:  Normal shoulder posture  Inspection:  No swelling  No erythema  No ecchymosis  Palpation:  mild tenderness at anterior shoulder  ROM:  Shoulder   Shoulder ER 60  Strength:  Supraspinatus 5/5  Infraspinatus 5/5  Stability:  (+) Apprehension  Tests: (+) Russ  (+) Cross-body adduction  Neurovascular:  Sensation intact in Ax/R/M/U nerve distributions  2+ radial pulse  Studies Reviewed  No studies to review    Procedures  No procedures today  Medical, Surgical, Family, and Social History  The patient's medical history, family history, and social history, were reviewed and updated as appropriate  History reviewed  No pertinent past medical history      Past Surgical History:   Procedure Laterality Date    FL INJECTION RIGHT SHOULDER (ARTHROGRAM)  8/5/2019    FL INJECTION RIGHT SHOULDER (ARTHROGRAM)  7/27/2020       Family History   Problem Relation Age of Onset    No Known Problems Mother     No Known Problems Father        Social History     Occupational History    Not on file   Tobacco Use    Smoking status: Never Smoker    Smokeless tobacco: Never Used   Substance and Sexual Activity    Alcohol use: Not Currently     Frequency: Never    Drug use: Not Currently    Sexual activity: Not on file       No Known Allergies      Current Outpatient Medications:     ibuprofen (MOTRIN) 800 mg tablet, Take 1 tablet (800 mg total) by mouth 3 (three) times a day, Disp: 21 tablet, Rfl: 0    albuterol (PROVENTIL HFA,VENTOLIN HFA) 90 mcg/act inhaler, Inhale 1 puff every 6 (six) hours as needed, Disp: , Rfl:       Barry Claude, PA-C    Scribe Attestation    I,:    am acting as a scribe while in the presence of the attending physician :        I,:    personally performed the services described in this documentation    as scribed in my presence :

## 2020-08-28 NOTE — PROGRESS NOTES
Orthopaedic Surgery - Office Note  Kalyan Krishna IV (21 y o  male)   : 1995   MRN: [id number]  Encounter Date: 2020    Chief Complaint   Patient presents with    Right Shoulder - Follow-up       Assessment / Plan  Right shoulder posterior labral tear, possible anterior labral tear with recurrent instability    · We will proceed with right shoulder arthroscopic labral repair on 2020  · Consent obtained at last visit  · Patient has sling which he will bring with him on the day of surgery  · Patient is to set up  PT prior to surgery  Return for 4-5 days post op  History of Present Illness  Kalyan Krishna IV is a 21 y o  male who presents right shoulder pain and instability  He is here prior to his right shoulder arthroscopic labral repair scheduled on 2020  Patient reports no change in his symptoms since his last visit  He has pain that is worse in the morning and at the end of the day  He experiences pain and popping when reaching across the chest  Patient has had multiple MRI arthrograms (one after initial injury and second on 20) demonstrating posterior labral tear and possible anterior labral tear  He has completed formal physical therapy without consistent improvement  He is taking ibuprofen for pain as needed  Denies new injury  Review of Systems  Pertinent items are noted in HPI  All other systems were reviewed and are negative  Physical Exam  /74   Pulse (!) 47   Temp 97 8 °F (36 6 °C)   Ht 5' 10" (1 778 m)   Wt 81 6 kg (180 lb)   BMI 25 83 kg/m²   Cons: Appears well  No apparent distress  Psych: Alert  Oriented x3  Mood and affect normal   Eyes: PERRLA, EOMI  Resp: Normal effort  No audible wheezing or stridor  CV: Palpable pulse  No discernable arrhythmia  No LE edema  Lymph:  No palpable cervical, axillary, or inguinal lymphadenopathy  Skin: Warm  No palpable masses  No visible lesions  Neuro: Normal muscle tone    Normal and symmetric DTR's      Right Shoulder Exam  Alignment / Posture:  Normal shoulder posture  Inspection:  No swelling  No erythema  No ecchymosis  Palpation:  mild tenderness at anterior shoulder  ROM:  Shoulder   Shoulder ER 60  Strength:  Supraspinatus 5/5  Infraspinatus 5/5  Stability:  (+) Apprehension  Tests: (+) Russ  (+) Cross-body adduction  Neurovascular:  Sensation intact in Ax/R/M/U nerve distributions  2+ radial pulse  Studies Reviewed  No studies to review    Procedures  No procedures today  Medical, Surgical, Family, and Social History  The patient's medical history, family history, and social history, were reviewed and updated as appropriate  History reviewed  No pertinent past medical history      Past Surgical History:   Procedure Laterality Date    FL INJECTION RIGHT SHOULDER (ARTHROGRAM)  8/5/2019    FL INJECTION RIGHT SHOULDER (ARTHROGRAM)  7/27/2020       Family History   Problem Relation Age of Onset    No Known Problems Mother     No Known Problems Father        Social History     Occupational History    Not on file   Tobacco Use    Smoking status: Never Smoker    Smokeless tobacco: Never Used   Substance and Sexual Activity    Alcohol use: Not Currently     Frequency: Never    Drug use: Not Currently    Sexual activity: Not on file       No Known Allergies      Current Outpatient Medications:     ibuprofen (MOTRIN) 800 mg tablet, Take 1 tablet (800 mg total) by mouth 3 (three) times a day, Disp: 21 tablet, Rfl: 0    albuterol (PROVENTIL HFA,VENTOLIN HFA) 90 mcg/act inhaler, Inhale 1 puff every 6 (six) hours as needed, Disp: , Rfl:       Juan Jose Bal PA-C    Scribe Attestation    I,:    am acting as a scribe while in the presence of the attending physician :        I,:    personally performed the services described in this documentation    as scribed in my presence :

## 2020-09-11 ENCOUNTER — ANESTHESIA EVENT (OUTPATIENT)
Dept: PERIOP | Facility: HOSPITAL | Age: 25
End: 2020-09-11
Payer: COMMERCIAL

## 2020-09-11 NOTE — PRE-PROCEDURE INSTRUCTIONS
Pre-Surgery Instructions:   Medication Instructions    albuterol (PROVENTIL HFA,VENTOLIN HFA) 90 mcg/act inhaler Instructed patient per Anesthesia Guidelines   ibuprofen (MOTRIN) 800 mg tablet Instructed patient per Anesthesia Guidelines  Instructed has no oral medications to take the morning of surgery, but may use Albuterol inhaler prn  No aspirin or NSAIDs from now until after surgery

## 2020-09-14 ENCOUNTER — ANESTHESIA (OUTPATIENT)
Dept: PERIOP | Facility: HOSPITAL | Age: 25
End: 2020-09-14
Payer: COMMERCIAL

## 2020-09-14 ENCOUNTER — HOSPITAL ENCOUNTER (OUTPATIENT)
Facility: HOSPITAL | Age: 25
Setting detail: OUTPATIENT SURGERY
Discharge: HOME | End: 2020-09-14
Attending: ORTHOPAEDIC SURGERY | Admitting: ORTHOPAEDIC SURGERY
Payer: COMMERCIAL

## 2020-09-14 VITALS
WEIGHT: 180 LBS | BODY MASS INDEX: 25.77 KG/M2 | DIASTOLIC BLOOD PRESSURE: 73 MMHG | HEIGHT: 70 IN | TEMPERATURE: 97.7 F | RESPIRATION RATE: 16 BRPM | OXYGEN SATURATION: 98 % | HEART RATE: 60 BPM | SYSTOLIC BLOOD PRESSURE: 124 MMHG

## 2020-09-14 VITALS — HEART RATE: 65 BPM

## 2020-09-14 DIAGNOSIS — S43.431A LABRAL TEAR OF SHOULDER, RIGHT, INITIAL ENCOUNTER: Primary | ICD-10-CM

## 2020-09-14 PROBLEM — J45.909 MILD ASTHMA WITHOUT COMPLICATION: Status: ACTIVE | Noted: 2020-09-14

## 2020-09-14 PROCEDURE — C1713 ANCHOR/SCREW BN/BN,TIS/BN: HCPCS | Performed by: ORTHOPAEDIC SURGERY

## 2020-09-14 PROCEDURE — 29806 SHO ARTHRS SRG CAPSULORRAPHY: CPT | Performed by: ORTHOPAEDIC SURGERY

## 2020-09-14 PROCEDURE — C9290 INJ, BUPIVACAINE LIPOSOME: HCPCS | Performed by: ANESTHESIOLOGY

## 2020-09-14 PROCEDURE — 29807 SHO ARTHRS SRG RPR SLAP LES: CPT | Performed by: ORTHOPAEDIC SURGERY

## 2020-09-14 DEVICE — SUTR ANCH,BIO-COMP S-TAK
Type: IMPLANTABLE DEVICE | Site: SHOULDER | Status: FUNCTIONAL
Brand: ARTHREX®

## 2020-09-14 RX ORDER — OXYCODONE HYDROCHLORIDE 5 MG/1
10 TABLET ORAL EVERY 4 HOURS PRN
Status: DISCONTINUED | OUTPATIENT
Start: 2020-09-14 | End: 2020-09-14 | Stop reason: HOSPADM

## 2020-09-14 RX ORDER — CEFAZOLIN SODIUM 2 G/50ML
2000 SOLUTION INTRAVENOUS ONCE
Status: COMPLETED | OUTPATIENT
Start: 2020-09-14 | End: 2020-09-14

## 2020-09-14 RX ORDER — NAPROXEN 500 MG/1
500 TABLET ORAL 2 TIMES DAILY WITH MEALS
Qty: 60 TABLET | Refills: 0 | Status: SHIPPED | OUTPATIENT
Start: 2020-09-14

## 2020-09-14 RX ORDER — FENTANYL CITRATE 50 UG/ML
INJECTION, SOLUTION INTRAMUSCULAR; INTRAVENOUS
Status: COMPLETED | OUTPATIENT
Start: 2020-09-14 | End: 2020-09-14

## 2020-09-14 RX ORDER — FENTANYL CITRATE/PF 50 MCG/ML
25 SYRINGE (ML) INJECTION
Status: DISCONTINUED | OUTPATIENT
Start: 2020-09-14 | End: 2020-09-14 | Stop reason: HOSPADM

## 2020-09-14 RX ORDER — MIDAZOLAM HYDROCHLORIDE 2 MG/2ML
INJECTION, SOLUTION INTRAMUSCULAR; INTRAVENOUS
Status: COMPLETED | OUTPATIENT
Start: 2020-09-14 | End: 2020-09-14

## 2020-09-14 RX ORDER — ONDANSETRON 4 MG/1
4 TABLET, ORALLY DISINTEGRATING ORAL EVERY 8 HOURS PRN
Qty: 15 TABLET | Refills: 0 | Status: SHIPPED | OUTPATIENT
Start: 2020-09-14

## 2020-09-14 RX ORDER — ONDANSETRON 2 MG/ML
4 INJECTION INTRAMUSCULAR; INTRAVENOUS ONCE AS NEEDED
Status: DISCONTINUED | OUTPATIENT
Start: 2020-09-14 | End: 2020-09-14 | Stop reason: HOSPADM

## 2020-09-14 RX ORDER — OXYCODONE HYDROCHLORIDE 5 MG/1
5 TABLET ORAL EVERY 4 HOURS PRN
Status: DISCONTINUED | OUTPATIENT
Start: 2020-09-14 | End: 2020-09-14 | Stop reason: HOSPADM

## 2020-09-14 RX ORDER — DEXAMETHASONE SODIUM PHOSPHATE 4 MG/ML
INJECTION, SOLUTION INTRA-ARTICULAR; INTRALESIONAL; INTRAMUSCULAR; INTRAVENOUS; SOFT TISSUE AS NEEDED
Status: DISCONTINUED | OUTPATIENT
Start: 2020-09-14 | End: 2020-09-14

## 2020-09-14 RX ORDER — ROCURONIUM BROMIDE 10 MG/ML
INJECTION, SOLUTION INTRAVENOUS AS NEEDED
Status: DISCONTINUED | OUTPATIENT
Start: 2020-09-14 | End: 2020-09-14

## 2020-09-14 RX ORDER — OXYCODONE HYDROCHLORIDE 5 MG/1
5 TABLET ORAL EVERY 4 HOURS PRN
Qty: 45 TABLET | Refills: 0 | Status: SHIPPED | OUTPATIENT
Start: 2020-09-14 | End: 2020-09-24

## 2020-09-14 RX ORDER — PROPOFOL 10 MG/ML
INJECTION, EMULSION INTRAVENOUS AS NEEDED
Status: DISCONTINUED | OUTPATIENT
Start: 2020-09-14 | End: 2020-09-14

## 2020-09-14 RX ORDER — BUPIVACAINE HYDROCHLORIDE 5 MG/ML
INJECTION, SOLUTION PERINEURAL
Status: COMPLETED | OUTPATIENT
Start: 2020-09-14 | End: 2020-09-14

## 2020-09-14 RX ORDER — MORPHINE SULFATE 10 MG/ML
2 INJECTION, SOLUTION INTRAMUSCULAR; INTRAVENOUS EVERY 4 HOURS PRN
Status: DISCONTINUED | OUTPATIENT
Start: 2020-09-14 | End: 2020-09-14 | Stop reason: HOSPADM

## 2020-09-14 RX ORDER — SODIUM CHLORIDE 9 MG/ML
125 INJECTION, SOLUTION INTRAVENOUS CONTINUOUS
Status: DISCONTINUED | OUTPATIENT
Start: 2020-09-14 | End: 2020-09-14 | Stop reason: HOSPADM

## 2020-09-14 RX ORDER — ONDANSETRON 2 MG/ML
INJECTION INTRAMUSCULAR; INTRAVENOUS AS NEEDED
Status: DISCONTINUED | OUTPATIENT
Start: 2020-09-14 | End: 2020-09-14

## 2020-09-14 RX ORDER — GLYCOPYRROLATE 0.2 MG/ML
INJECTION INTRAMUSCULAR; INTRAVENOUS AS NEEDED
Status: DISCONTINUED | OUTPATIENT
Start: 2020-09-14 | End: 2020-09-14

## 2020-09-14 RX ORDER — NEOSTIGMINE METHYLSULFATE 1 MG/ML
INJECTION INTRAVENOUS AS NEEDED
Status: DISCONTINUED | OUTPATIENT
Start: 2020-09-14 | End: 2020-09-14

## 2020-09-14 RX ADMIN — PHENYLEPHRINE HYDROCHLORIDE 50 MCG: 10 INJECTION INTRAVENOUS at 08:43

## 2020-09-14 RX ADMIN — SODIUM CHLORIDE 125 ML/HR: 0.9 INJECTION, SOLUTION INTRAVENOUS at 05:32

## 2020-09-14 RX ADMIN — FENTANYL CITRATE 100 MCG: 50 INJECTION, SOLUTION INTRAMUSCULAR; INTRAVENOUS at 06:56

## 2020-09-14 RX ADMIN — GLYCOPYRROLATE 0.4 MG: 0.2 INJECTION, SOLUTION INTRAMUSCULAR; INTRAVENOUS at 09:04

## 2020-09-14 RX ADMIN — PROPOFOL 200 MG: 10 INJECTION, EMULSION INTRAVENOUS at 07:29

## 2020-09-14 RX ADMIN — PHENYLEPHRINE HYDROCHLORIDE 50 MCG: 10 INJECTION INTRAVENOUS at 08:47

## 2020-09-14 RX ADMIN — LIDOCAINE HYDROCHLORIDE 100 MG: 20 INJECTION, SOLUTION INTRAVENOUS at 07:29

## 2020-09-14 RX ADMIN — CEFAZOLIN SODIUM 2000 MG: 2 SOLUTION INTRAVENOUS at 07:15

## 2020-09-14 RX ADMIN — SODIUM CHLORIDE: 0.9 INJECTION, SOLUTION INTRAVENOUS at 08:11

## 2020-09-14 RX ADMIN — NEOSTIGMINE METHYLSULFATE 3 MG: 1 INJECTION, SOLUTION INTRAVENOUS at 09:04

## 2020-09-14 RX ADMIN — BUPIVACAINE HYDROCHLORIDE 10 ML: 5 INJECTION, SOLUTION PERINEURAL at 06:56

## 2020-09-14 RX ADMIN — ONDANSETRON 4 MG: 2 INJECTION INTRAMUSCULAR; INTRAVENOUS at 09:00

## 2020-09-14 RX ADMIN — DEXAMETHASONE SODIUM PHOSPHATE 4 MG: 4 INJECTION, SOLUTION INTRAMUSCULAR; INTRAVENOUS at 07:42

## 2020-09-14 RX ADMIN — MIDAZOLAM 4 MG: 1 INJECTION INTRAMUSCULAR; INTRAVENOUS at 06:56

## 2020-09-14 RX ADMIN — ROCURONIUM BROMIDE 50 MG: 10 INJECTION, SOLUTION INTRAVENOUS at 07:29

## 2020-09-14 NOTE — ANESTHESIA PROCEDURE NOTES
Peripheral Block    Patient location during procedure: holding area  Start time: 9/14/2020 6:56 AM  Reason for block: at surgeon's request and post-op pain management  Staffing  Anesthesiologist: Neil Cta DO  Performed: anesthesiologist   Preanesthetic Checklist  Completed: patient identified, site marked, surgical consent, pre-op evaluation, timeout performed, IV checked, risks and benefits discussed and monitors and equipment checked  Peripheral Block  Patient position: sitting  Prep: ChloraPrep  Patient monitoring: heart rate, cardiac monitor, continuous pulse ox and frequent blood pressure checks  Block type: interscalene  Laterality: right  Injection technique: single-shot  Ultrasound permanent image savedbupivacaine (MARCAINE) 0 5 % perineural infiltration, 10 mL  fentaNYL 50 mcg/mL IV, 100 mcg  midazolam (VERSED) 2 mg/2 mL IV, 4 mg  Needle  Needle type: Stimuplex   Needle gauge: 22 G  Needle length: 5 cm  Needle localization: ultrasound guidance  Test dose: negative  Assessment  Injection assessment: incremental injection  Paresthesia pain: none  Heart rate change: no  Slow fractionated injection: no  Post-procedure:  site cleaned  patient tolerated the procedure well with no immediate complications

## 2020-09-14 NOTE — ANESTHESIA PREPROCEDURE EVALUATION
Procedure:  SHOULDER ARTHROSCOPY, post labral repair (Right Shoulder)    Relevant Problems   ANESTHESIA (within normal limits)      CARDIO (within normal limits)      ENDO (within normal limits)      GI/HEPATIC (within normal limits)      HEMATOLOGY (within normal limits)      NEURO/PSYCH (within normal limits)      PULMONARY   (+) Mild asthma without complication        Physical Exam    Airway    Mallampati score: I  TM Distance: >3 FB  Neck ROM: full     Dental   No notable dental hx     Cardiovascular  Cardiovascular exam normal    Pulmonary  Pulmonary exam normal     Other Findings        Anesthesia Plan  ASA Score- 2     Anesthesia Type- general and regional with ASA Monitors  Additional Monitors:   Airway Plan:     Comment: Interscalene block with Exparel for postoperative pain control  Plan Factors-    Chart reviewed  Induction- intravenous  Postoperative Plan-     Informed Consent- Anesthetic plan and risks discussed with patient

## 2020-09-14 NOTE — INTERVAL H&P NOTE
H&P reviewed  After examining the patient I find no changes in the patients condition since the H&P had been written      Vitals:    09/14/20 0705   BP: 123/68   Pulse: (!) 50   Resp: 16   Temp:    SpO2: 99%

## 2020-09-14 NOTE — ANESTHESIA POSTPROCEDURE EVALUATION
Post-Op Assessment Note    CV Status:  Stable    Pain management: adequate     Mental Status:  Alert and awake   Hydration Status:  Euvolemic   PONV Controlled:  Controlled   Airway Patency:  Patent      Post Op Vitals Reviewed: Yes            No complications documented      /68 (09/14/20 1003)    Temp 97 7 °F (36 5 °C) (09/14/20 1003)    Pulse 68 (09/14/20 1003)   Resp 20 (09/14/20 1003)    SpO2 95 % (09/14/20 1003)

## 2020-09-14 NOTE — OP NOTE
OPERATIVE REPORT    PATIENT NAME: Betzy Small IV   :  1995  MRN: [id number]  Pt Location: [facility] OR ROOM 02    SURGERY DATE: 2020    SURGEON(S) and ROLE:  Primary: Chaim Damon MD  Assisting: Mikey Ldeezma DPM; Daniel Ureña PA-C    NOTE:  The presence of a physician assistant was necessary to help with patient positioning, surgical exposure, wound retraction, wound closure, and other key portions of the procedure  No qualified resident was available for this case  PREOPERATIVE DIAGNOSES:  Right Shoulder  Posterior Labral Tear  Superior Labral Tear    POSTOPERATIVE DIAGNOSES:  Right Shoulder  Posterior Labral Tear  Superior Labral Tear    PROCEDURES:  Right Shoulder Arthroscopy with:  Superior Labral Repair  Posterior Labral Repair      ANESTHESIA TYPE:  General endotracheal and Interscalene block    ANESTHESIA STAFF:   Anesthesiologist: Jada Ibrahim DO  CRNA: Kindra Cosby CRNA    ESTIMATED BLOOD LOSS:  5 mL    PERIOPERATIVE ANTIBIOTICS:  cefazolin, 2 grams    IMPLANTS:  Arthrex Suturetack (x2)     Fibertack Knotless (x3)    Implant Name Type Inv  Item Serial No   Lot No  LRB No  Used Action   ANCHOR SUT 3 X 14 5MM W/ 2-NUMBER 2 FIBERWIRE BCMPS SUTURETAK - [id number]  ANCHOR SUT 3 X 14 5MM W/ 2-NUMBER 2 FIB[employer] BCM[employer] [address] [id number] Right 2 Implanted   ANCHOR SUT KNOTLESS FIBERTAK 1 8 - [id number]  ANCHOR SUT KNOTLESS FIBERTAK 1 8  [employer] [id number] Right 3 Implanted       SPECIMENS:  * No specimens in log *      OPERATIVE INDICATIONS:  The patient is a 21 y o  male with right shoulder pain and instability with posterior and superior labral tears  Surgical treatment was indicated due to persistent symptoms despite non-surgical treatment  After a thorough discussion of the potential risks, benefits, and alternative treatments, the patient agreed to proceed with surgery    The patient understands that the risks of surgery include, but are not limited to: failure of repair, infection, neurovascular injury, wound healing complications, venous thromboembolism, persistent pain, stiffness, instability, recurrence of symptoms, potential need for additional surgeries, and loss of limb or life  Oral and written consent for surgery was obtained from the patient preoperatively  EXAM UNDER ANESTHESIA:  Operative shoulder:   FE-170  ER-80  AER-90  AIR-80;  Load-Shift- 1+ ant / 2+ post;  Sulcus- 1 cm    PROCEDURE AND TECHNIQUE:  On the day of surgery, the patient was identified in the preoperative holding area  The operative site was marked by the surgeon  The patient was taken into the operating room  A time-out was conducted to confirm the patient's identity, the operative site, and the proposed procedure  The patient was anesthetized, and perioperative antibiotics were administered  The patient was positioned lateral on the OR table  All bony prominences were padded  The operative site was prepped and draped using standard sterile technique  A posterior portal was established, and the arthroscope was placed into the glenohumeral joint  An anterosuperior portal was established through the rotator interval   Diagnostic arthroscopy was performed  The glenohumeral joint demonstrated no synovitis or loose bodies  The glenoid demonstrated pristine articular cartilage  The humeral head demonstrated pristine articular cartilage  The long head of the biceps tendon was intact, without inflammation or tearing  The superior labrum had a Type 2 tear from 1 o'clock anterosuperior to 11 o'clock posterosuperior  The supraglenoid tubercle was prepared to a bleeding bed of bone with a dorene  The superior labrum was repaired using one Fibertack Knotless placed at 11:00  The posterior capsulolabral complex  had a labral tear from 11 o'clock posterosuperior to 6 o'clock inferior  The anterior capsulolabral complex was intact    An accessory anteroinferior portal was established  The torn capsulolabral tissue was elevated from the glenoid neck, and the glenoid neck was prepared with rasps and a dorene  The labrum was repaired with two Suturetack and two Fibertack Knotlss suture anchors placed at 5 o'clock, 7 o'clock, 8 o'clock and 9 o'clock  The labral fixation was excellent, and it restored the capsulolabral bumper and appropriately tensioned the inferior glenohumeral ligament  The subscapularis tendon was intact       The posterosuperior rotator cuff was intact       At the conclusion of the procedure, the instruments were withdrawn  The portals and incisions were closed with absorbable sutures and steri-strips  A sterile dressing was applied  The surgical drapes were removed  The operative arm was immobilized in a external rotation brace  The patient was awakened from anesthesia and transported to the PACU in stable condition        COMPLICATIONS:  None    PATIENT DISPOSITION:  PACU       SIGNATURE:  Christina Gaspar MD  DATE:  September 14, 2020  TIME:  10:37 AM

## 2020-09-14 NOTE — DISCHARGE INSTRUCTIONS
POSTOPERATIVE INSTRUCTIONS following SHOULDER SURGERY    MEDICATIONS:  · Resume all home medications unless otherwise instructed by your surgeon  · Pain Medication:  Oxycodone 5 mg, 1-3 tablets every 3 hours as needed  · If you were given a regional anesthetic (nerve block), please begin taking the pain medication as soon as you get home, even if you have minimal or no pain  DO NOT WAIT FOR THE NERVE BLOCK TO WEAR OFF  · Possible side effects include nausea, constipation, and urinary retention  If you experience these side effects, please call our office for assistance  · Pain med refills are authorized only during office hours (8am-4pm, Mon-Fri)  · Anti-Inflammatory:  Naproxen 500 mg, 1 tablet every 12 hours for 4 weeks  · TAKE WITH FOOD  Stop if you experience nausea, reflux, or stomach pain  · Nausea Medication:  Zofran ODT 4 mg, 1 tablet every 6 hours as needed for nausea  · Fill prescription ONLY if you expericnce severe nausea  WOUND CARE:  · Keep the dressing clean and dry  Light drainage may occur the first 2 days postop  · You may remove the dressings and get the incision wet in the shower 72 hours after surgery  DO NOT remove steri-strips or sutures  DO NOT immerse the incision under water  Carefully pat the incision dry  If there is wound drainage, re-apply a fresh dry gauze dressing  · Please call our office ([phone]) if you experience either of the following:  · Sudden increase in swelling, redness, or warmth at the surgical site  · Excessive incisional drainage that persists beyond the 3rd day after surgery  · Oral temperature greater than 101 degrees, not relieved with Tylenol  · Shortness of breath, chest pain, nausea, or any other concerning symptoms    SWELLING CONTROL:  · Cold Therapy: The cold therapy device may be used either continuously or only as needed, according to your preference  Do not let the pad directly touch your skin    Alternatively, apply ice (20 min on, 20 min off) as often as you feel is necessary  SLING:  · Wear your sling AT ALL TIMES (including sleep) until your first postoperative office visit  You may remove the sling for showering but must keep your arm at your side  ACTIVITY:   · DO NOT lift, carry, push, or pull anything with your operative arm  · Shoulder:  DO NOT actively (on your own) raise your operative arm away from the side of you body or rotate it out away from your body unless instructed by your surgeon or physical therapist   · Place a pillow behind the elbow while lying down  · Sleeping in a more upright position (recliner) may be more comfortable initially  · Wrist / Finger Motion:  With the sling on, move your wrist and fingers through a full range of motion 20 times per hour while awake  PHYSICAL THERAPY:  · Begin therapy 5 TO 7 DAYS AFTER SURGERY  You were given a prescription for therapy at your preoperative office visit  If you do not have physical therapy scheduled yet, please call our office for assistance  FOLLOW-UP APPOINTMENT:  · 4-5 days after surgery with:    Dr Curtis Paulino   [facility], [address] [facility]  [address] [place] [address], [address] [place] [address], [facility], [address]   [phone] ([facility] [address])  [phone] (After Hours)

## 2020-09-18 ENCOUNTER — OFFICE VISIT (OUTPATIENT)
Dept: OBGYN CLINIC | Facility: MEDICAL CENTER | Age: 25
End: 2020-09-18

## 2020-09-18 VITALS
TEMPERATURE: 98.8 F | WEIGHT: 180 LBS | DIASTOLIC BLOOD PRESSURE: 74 MMHG | SYSTOLIC BLOOD PRESSURE: 114 MMHG | HEIGHT: 70 IN | BODY MASS INDEX: 25.77 KG/M2 | HEART RATE: 56 BPM

## 2020-09-18 DIAGNOSIS — S43.431A LABRAL TEAR OF SHOULDER, RIGHT, INITIAL ENCOUNTER: Primary | ICD-10-CM

## 2020-09-18 PROCEDURE — 99024 POSTOP FOLLOW-UP VISIT: CPT | Performed by: ORTHOPAEDIC SURGERY

## 2020-09-18 NOTE — PROGRESS NOTES
Orthopaedic Surgery - Office Note  Yesenia Man IV (21 y o  male)   : 1995   MRN: [id number]  Encounter Date: 2020    Chief Complaint   Patient presents with    Right Shoulder - Post-op       Assessment / Plan  Status post right shoulder arthroscopy with posterior and superior labral repairs on 2020    · Start PT following posterior shoulder stabilization protocol which was provided to the patient at today's visit  Patient was provided with a PT script today  · Continue with external rotation brace as instructed  · Continue with ice and analgesics as needed  · Patient is to remain out of work at this time with expectation to return to full duty without restriction 5-6 months postoperatively  Return in about 5 weeks (around 10/23/2020)  History of Present Illness  Yesenia Man IV is a 21 y o  male who presents status post right shoulder arthroscopy with posterior and superior labral repairs on 2020  Patient is doing well at this time his pain is very well managed  He has been compliant with the external rotation brace and is tolerating it well  Patient is denying any distal numbness or tingling at this time  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, he plays quarterback  Review of Systems  Pertinent items are noted in HPI  All other systems were reviewed and are negative  Physical Exam  /74   Pulse 56   Temp 98 8 °F (37 1 °C)   Ht 5' 10" (1 778 m)   Wt 81 6 kg (180 lb)   BMI 25 83 kg/m²   Cons: Appears well  No apparent distress  Psych: Alert  Oriented x3  Mood and affect normal   Eyes: PERRLA, EOMI  Resp: Normal effort  No audible wheezing or stridor  CV: Palpable pulse  No discernable arrhythmia  No LE edema  Lymph:  No palpable cervical, axillary, or inguinal lymphadenopathy  Skin: Warm  No palpable masses  No visible lesions  Neuro: Normal muscle tone  Normal and symmetric DTR's       Right Shoulder Exam  Alignment / Posture: Normal shoulder posture  Inspection:  Mild right shoulder swelling as expected  Incisions healing well Steri-Strips were replaced at today's visit  Palpation:  No tenderness  ROM:  Not tested  Strength:  Not tested  Stability:  Not tested  Tests: No pertinent positive or negative tests  Neurovascular:  Sensation intact in Ax/R/M/U nerve distributions  Sensation intact in all digital nerve distributions  Fingers warm and perfused  Studies Reviewed  No studies to review    Procedures  No procedures today  Medical, Surgical, Family, and Social History  The patient's medical history, family history, and social history, were reviewed and updated as appropriate      Past Medical History:   Diagnosis Date    Asthma        Past Surgical History:   Procedure Laterality Date    FL INJECTION RIGHT SHOULDER (ARTHROGRAM)  8/5/2019    FL INJECTION RIGHT SHOULDER (ARTHROGRAM)  7/27/2020    MO SHLDR ARTHROSCOP,SURG,REPAIR,SLAP LESION Right 9/14/2020    Procedure: SHOULDER ARTHROSCOPY, POSTERIOR & SUPERIOR LABRAL REPAIR;  Surgeon: Leonard Ponce MD;  Location: [facility] OR;  Service: Orthopedics       Family History   Problem Relation Age of Onset    No Known Problems Mother     No Known Problems Father        Social History     Occupational History    Not on file   Tobacco Use    Smoking status: Never Smoker    Smokeless tobacco: Never Used   Substance and Sexual Activity    Alcohol use: Not Currently     Frequency: Never    Drug use: Yes     Types: Marijuana     Comment: twice a week     Sexual activity: Not on file       No Known Allergies      Current Outpatient Medications:     albuterol (PROVENTIL HFA,VENTOLIN HFA) 90 mcg/act inhaler, Inhale 1 puff every 6 (six) hours as needed, Disp: , Rfl:     naproxen (NAPROSYN) 500 mg tablet, Take 1 tablet (500 mg total) by mouth 2 (two) times a day with meals, Disp: 60 tablet, Rfl: 0    ondansetron (ZOFRAN-ODT) 4 mg disintegrating tablet, Take 1 tablet (4 mg total) by mouth every 8 (eight) hours as needed for nausea or vomiting, Disp: 15 tablet, Rfl: 0    oxyCODONE (ROXICODONE) 5 mg immediate release tablet, Take 1 tablet (5 mg total) by mouth every 4 (four) hours as needed for moderate pain for up to 10 daysMax Daily Amount: 30 mg, Disp: 45 tablet, Rfl: 0      Manisha Sequeira PA-C    Scribe Attestation    I,:    am acting as a scribe while in the presence of the attending physician :        I,:    personally performed the services described in this documentation    as scribed in my presence :

## 2020-09-25 ENCOUNTER — EVALUATION (OUTPATIENT)
Dept: PHYSICAL THERAPY | Facility: CLINIC | Age: 25
End: 2020-09-25
Payer: COMMERCIAL

## 2020-09-25 DIAGNOSIS — S43.431A LABRAL TEAR OF SHOULDER, RIGHT, INITIAL ENCOUNTER: ICD-10-CM

## 2020-09-25 DIAGNOSIS — M25.511 ACUTE PAIN OF RIGHT SHOULDER: Primary | ICD-10-CM

## 2020-09-25 PROCEDURE — 97161 PT EVAL LOW COMPLEX 20 MIN: CPT | Performed by: PHYSICAL THERAPIST

## 2020-09-25 NOTE — PROGRESS NOTES
PT Evaluation     Today's date: 2020  Patient name: Romaine Murillo  : 1995  MRN: [id number]  Referring provider: Amari Gongora PA-C  Dx:   Encounter Diagnosis     ICD-10-CM    1  Acute pain of right shoulder  M25 511    2  Labral tear of shoulder, right, initial encounter  S43 431A Ambulatory referral to Physical Therapy                  Assessment  Assessment details: Romaine Murillo is a 21 y o  male presenting as an outpatient to [facility] PT s/p R superior and posterior labral repair on 2020  Pt presents decreased ROM and although not assessed on  due to post op restrictions, pt will also present w/ decreased strength significantly limiting pt's functional ability  Pt will benefit from skilled PT services to address the above deficits as per protocol in order to max function to allow pt to achieve goals in PT  Thank you for the referral of this pt  Impairments: abnormal or restricted ROM, activity intolerance, impaired physical strength, lacks appropriate home exercise program and weight-bearing intolerance  Understanding of Dx/Px/POC: good   Prognosis: good    Goals  ST  Pt will be compliant w/ HEP in 1-2 weeks  2  Appropriate ROM achieved in 6 weeks as per protocol  LT  Decrease pain to 1-2/10 at worst by d/c   2  Increase ROM to [facility] for all deficient movements by d/c   3  Strength increased to 5 for all deficient muscle groups by d/c   4  IADL performance increased to max function by d/c   5  Recreational performance increased to max function by d/c   6  Pt will return to work w/ 1-2/10 pain at worst by d/c      Plan  Planned modality interventions: cryotherapy  Other planned modality interventions: other modalities PRN  Planned therapy interventions: abdominal trunk stabilization, ADL retraining, IADL retraining, flexibility, functional ROM exercises, graded exercise, home exercise program, neuromuscular re-education, patient education, postural training, strengthening, therapeutic exercise, therapeutic activities, work reintegration and manual therapy  Other planned therapy interventions: other interventions PRN  Frequency: 2-3x/week  Duration in weeks: 8  Plan of Care beginning date: 2020  Plan of Care expiration date: 2020  Treatment plan discussed with: patient        Subjective Evaluation    History of Present Illness  Date of surgery: 2020  Mechanism of injury: Pt reports to IE wearing sling s/p R shoulder labral repair  Pt reports initial injury occurred when he was playing football and he tripped and fell  Another player fell on him causing labral tear  Pt reports intermittent c/s pain since surgery  Pt denies any medical complications w/ surgery  Pt denies any numbness/parasthesias  Pain  Current pain ratin  At worst pain ratin  Location: R shoulder  Relieving factors: ice, rest and medications (Naproxen PRN)  Exacerbated by: Not yet moving UE      Social Support    Employment status: not working (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)  Hand dominance: right    Patient Goals  Patient goal: Regain RUE use        Objective     Active Range of Motion     Additional Active Range of Motion Details  Unable to assess shoulder on  as per protocol    Gross c/s ROM (R/L):   flexion: WFL  extension: [facility]  rotation: 75%/75%  lateral flexion: 75%/75%    Passive Range of Motion     Additional Passive Range of Motion Details  Unable to assess on  as per protocol    Strength/Myotome Testing     Additional Strength Details  Unable to assess  as per protocol    General Comments:      Shoulder Comments   Observation/Posture:  Pt presents w/ RUE sling w/o abduction pillow (pt reports only instructed to wear pillow while sleeping); 4 incisions- 2 anterior and 2 posterior all appear to be healing well w/o s/s of infection      Pt reports being able to independently don/doff sling and denied need to practice      Flowsheet Rows      Most Recent Value   PT/OT G-Codes   Current Score  30   Projected Score  74              Daily Treatment Diary    EPOC: 11/20/2020   Precautions: FOLLOW PROTOCOL - NO PROM UNTIL 3 WEEKS POST OP (10/5)  Co- Morbidities:   Patient Active Problem List   Diagnosis    Labral tear of shoulder, right, initial encounter    Mild asthma without complication         Manual  9/25         L Shoulder PROM  NV          Gentle TPR/STM to UT PRN                       Total Time            Exercise Diary 9/25         HEP INSTRUCT/SELF MGM 5'      THEREX       Pendulums   m/l &a/p           UT stretch           Posterior shoulder rolls           NEURO RE-ED           Scapular squeeze                                                                                                                          Modalities  9/25         CP  Home PRN

## 2020-10-05 ENCOUNTER — OFFICE VISIT (OUTPATIENT)
Dept: PHYSICAL THERAPY | Facility: CLINIC | Age: 25
End: 2020-10-05
Payer: COMMERCIAL

## 2020-10-05 DIAGNOSIS — M25.511 ACUTE PAIN OF RIGHT SHOULDER: ICD-10-CM

## 2020-10-05 DIAGNOSIS — S43.431A LABRAL TEAR OF SHOULDER, RIGHT, INITIAL ENCOUNTER: Primary | ICD-10-CM

## 2020-10-05 PROCEDURE — 97110 THERAPEUTIC EXERCISES: CPT | Performed by: PHYSICAL THERAPIST

## 2020-10-05 PROCEDURE — 97140 MANUAL THERAPY 1/> REGIONS: CPT | Performed by: PHYSICAL THERAPIST

## 2020-10-09 ENCOUNTER — OFFICE VISIT (OUTPATIENT)
Dept: PHYSICAL THERAPY | Facility: CLINIC | Age: 25
End: 2020-10-09
Payer: COMMERCIAL

## 2020-10-09 DIAGNOSIS — M25.511 ACUTE PAIN OF RIGHT SHOULDER: ICD-10-CM

## 2020-10-09 DIAGNOSIS — S43.431A LABRAL TEAR OF SHOULDER, RIGHT, INITIAL ENCOUNTER: Primary | ICD-10-CM

## 2020-10-09 PROCEDURE — 97110 THERAPEUTIC EXERCISES: CPT | Performed by: PHYSICAL THERAPIST

## 2020-10-09 PROCEDURE — 97140 MANUAL THERAPY 1/> REGIONS: CPT | Performed by: PHYSICAL THERAPIST

## 2020-10-12 ENCOUNTER — OFFICE VISIT (OUTPATIENT)
Dept: PHYSICAL THERAPY | Facility: CLINIC | Age: 25
End: 2020-10-12
Payer: COMMERCIAL

## 2020-10-12 DIAGNOSIS — M25.511 ACUTE PAIN OF RIGHT SHOULDER: ICD-10-CM

## 2020-10-12 DIAGNOSIS — S43.431A LABRAL TEAR OF SHOULDER, RIGHT, INITIAL ENCOUNTER: Primary | ICD-10-CM

## 2020-10-12 PROCEDURE — 97110 THERAPEUTIC EXERCISES: CPT | Performed by: PHYSICAL THERAPIST

## 2020-10-12 PROCEDURE — 97140 MANUAL THERAPY 1/> REGIONS: CPT | Performed by: PHYSICAL THERAPIST

## 2020-10-16 ENCOUNTER — APPOINTMENT (OUTPATIENT)
Dept: PHYSICAL THERAPY | Facility: CLINIC | Age: 25
End: 2020-10-16
Payer: COMMERCIAL

## 2020-10-19 ENCOUNTER — EVALUATION (OUTPATIENT)
Dept: PHYSICAL THERAPY | Facility: CLINIC | Age: 25
End: 2020-10-19
Payer: COMMERCIAL

## 2020-10-19 DIAGNOSIS — M25.511 ACUTE PAIN OF RIGHT SHOULDER: ICD-10-CM

## 2020-10-19 DIAGNOSIS — S43.431A LABRAL TEAR OF SHOULDER, RIGHT, INITIAL ENCOUNTER: Primary | ICD-10-CM

## 2020-10-19 PROCEDURE — 97110 THERAPEUTIC EXERCISES: CPT | Performed by: PHYSICAL THERAPIST

## 2020-10-19 PROCEDURE — 97140 MANUAL THERAPY 1/> REGIONS: CPT | Performed by: PHYSICAL THERAPIST

## 2020-10-23 ENCOUNTER — OFFICE VISIT (OUTPATIENT)
Dept: PHYSICAL THERAPY | Facility: CLINIC | Age: 25
End: 2020-10-23
Payer: COMMERCIAL

## 2020-10-23 VITALS
SYSTOLIC BLOOD PRESSURE: 109 MMHG | DIASTOLIC BLOOD PRESSURE: 71 MMHG | BODY MASS INDEX: 25.77 KG/M2 | WEIGHT: 180 LBS | HEART RATE: 50 BPM | TEMPERATURE: 98.5 F | HEIGHT: 70 IN

## 2020-10-23 DIAGNOSIS — S43.431A LABRAL TEAR OF SHOULDER, RIGHT, INITIAL ENCOUNTER: Primary | ICD-10-CM

## 2020-10-23 DIAGNOSIS — M25.511 ACUTE PAIN OF RIGHT SHOULDER: Primary | ICD-10-CM

## 2020-10-23 DIAGNOSIS — S43.431A LABRAL TEAR OF SHOULDER, RIGHT, INITIAL ENCOUNTER: ICD-10-CM

## 2020-10-23 PROCEDURE — 97112 NEUROMUSCULAR REEDUCATION: CPT

## 2020-10-23 PROCEDURE — 97140 MANUAL THERAPY 1/> REGIONS: CPT

## 2020-10-23 PROCEDURE — 97110 THERAPEUTIC EXERCISES: CPT

## 2020-10-23 PROCEDURE — 99024 POSTOP FOLLOW-UP VISIT: CPT | Performed by: ORTHOPAEDIC SURGERY

## 2020-10-26 ENCOUNTER — OFFICE VISIT (OUTPATIENT)
Dept: PHYSICAL THERAPY | Facility: CLINIC | Age: 25
End: 2020-10-26
Payer: COMMERCIAL

## 2020-10-26 DIAGNOSIS — S43.431A LABRAL TEAR OF SHOULDER, RIGHT, INITIAL ENCOUNTER: ICD-10-CM

## 2020-10-26 DIAGNOSIS — M25.511 ACUTE PAIN OF RIGHT SHOULDER: Primary | ICD-10-CM

## 2020-10-26 PROCEDURE — 97112 NEUROMUSCULAR REEDUCATION: CPT | Performed by: PHYSICAL THERAPIST

## 2020-10-26 PROCEDURE — 97140 MANUAL THERAPY 1/> REGIONS: CPT | Performed by: PHYSICAL THERAPIST

## 2020-10-26 PROCEDURE — 97110 THERAPEUTIC EXERCISES: CPT | Performed by: PHYSICAL THERAPIST

## 2020-10-30 ENCOUNTER — APPOINTMENT (OUTPATIENT)
Dept: PHYSICAL THERAPY | Facility: CLINIC | Age: 25
End: 2020-10-30
Payer: COMMERCIAL

## 2020-11-02 ENCOUNTER — OFFICE VISIT (OUTPATIENT)
Dept: PHYSICAL THERAPY | Facility: CLINIC | Age: 25
End: 2020-11-02
Payer: COMMERCIAL

## 2020-11-02 DIAGNOSIS — S43.431A LABRAL TEAR OF SHOULDER, RIGHT, INITIAL ENCOUNTER: ICD-10-CM

## 2020-11-02 DIAGNOSIS — M25.511 ACUTE PAIN OF RIGHT SHOULDER: Primary | ICD-10-CM

## 2020-11-02 PROCEDURE — 97110 THERAPEUTIC EXERCISES: CPT | Performed by: PHYSICAL THERAPIST

## 2020-11-06 ENCOUNTER — OFFICE VISIT (OUTPATIENT)
Dept: PHYSICAL THERAPY | Facility: CLINIC | Age: 25
End: 2020-11-06
Payer: COMMERCIAL

## 2020-11-06 DIAGNOSIS — S43.431A LABRAL TEAR OF SHOULDER, RIGHT, INITIAL ENCOUNTER: ICD-10-CM

## 2020-11-06 DIAGNOSIS — M25.511 ACUTE PAIN OF RIGHT SHOULDER: Primary | ICD-10-CM

## 2020-11-06 PROCEDURE — 97140 MANUAL THERAPY 1/> REGIONS: CPT

## 2020-11-06 PROCEDURE — 97110 THERAPEUTIC EXERCISES: CPT

## 2020-11-09 ENCOUNTER — OFFICE VISIT (OUTPATIENT)
Dept: PHYSICAL THERAPY | Facility: CLINIC | Age: 25
End: 2020-11-09
Payer: COMMERCIAL

## 2020-11-09 DIAGNOSIS — S43.431A LABRAL TEAR OF SHOULDER, RIGHT, INITIAL ENCOUNTER: ICD-10-CM

## 2020-11-09 DIAGNOSIS — M25.511 ACUTE PAIN OF RIGHT SHOULDER: Primary | ICD-10-CM

## 2020-11-09 PROCEDURE — 97112 NEUROMUSCULAR REEDUCATION: CPT | Performed by: PHYSICAL THERAPIST

## 2020-11-09 PROCEDURE — 97140 MANUAL THERAPY 1/> REGIONS: CPT | Performed by: PHYSICAL THERAPIST

## 2020-11-09 PROCEDURE — 97110 THERAPEUTIC EXERCISES: CPT | Performed by: PHYSICAL THERAPIST

## 2020-11-16 ENCOUNTER — OFFICE VISIT (OUTPATIENT)
Dept: PHYSICAL THERAPY | Facility: CLINIC | Age: 25
End: 2020-11-16
Payer: COMMERCIAL

## 2020-11-16 DIAGNOSIS — S43.431A LABRAL TEAR OF SHOULDER, RIGHT, INITIAL ENCOUNTER: ICD-10-CM

## 2020-11-16 DIAGNOSIS — M25.511 ACUTE PAIN OF RIGHT SHOULDER: Primary | ICD-10-CM

## 2020-11-16 PROCEDURE — 97112 NEUROMUSCULAR REEDUCATION: CPT | Performed by: PHYSICAL THERAPIST

## 2020-11-16 PROCEDURE — 97140 MANUAL THERAPY 1/> REGIONS: CPT | Performed by: PHYSICAL THERAPIST

## 2020-11-16 PROCEDURE — 97110 THERAPEUTIC EXERCISES: CPT | Performed by: PHYSICAL THERAPIST

## 2020-11-20 ENCOUNTER — EVALUATION (OUTPATIENT)
Dept: PHYSICAL THERAPY | Facility: CLINIC | Age: 25
End: 2020-11-20
Payer: COMMERCIAL

## 2020-11-20 DIAGNOSIS — M25.511 ACUTE PAIN OF RIGHT SHOULDER: Primary | ICD-10-CM

## 2020-11-20 DIAGNOSIS — S43.431A LABRAL TEAR OF SHOULDER, RIGHT, INITIAL ENCOUNTER: ICD-10-CM

## 2020-11-20 PROCEDURE — 97110 THERAPEUTIC EXERCISES: CPT | Performed by: PHYSICAL THERAPIST

## 2020-11-24 ENCOUNTER — OFFICE VISIT (OUTPATIENT)
Dept: PHYSICAL THERAPY | Facility: CLINIC | Age: 25
End: 2020-11-24
Payer: COMMERCIAL

## 2020-11-24 VITALS
SYSTOLIC BLOOD PRESSURE: 127 MMHG | HEART RATE: 49 BPM | WEIGHT: 180 LBS | HEIGHT: 70 IN | BODY MASS INDEX: 25.77 KG/M2 | TEMPERATURE: 97.6 F | DIASTOLIC BLOOD PRESSURE: 90 MMHG

## 2020-11-24 DIAGNOSIS — M25.511 ACUTE PAIN OF RIGHT SHOULDER: Primary | ICD-10-CM

## 2020-11-24 DIAGNOSIS — S43.431A LABRAL TEAR OF SHOULDER, RIGHT, INITIAL ENCOUNTER: ICD-10-CM

## 2020-11-24 DIAGNOSIS — S43.431A LABRAL TEAR OF SHOULDER, RIGHT, INITIAL ENCOUNTER: Primary | ICD-10-CM

## 2020-11-24 PROCEDURE — 99024 POSTOP FOLLOW-UP VISIT: CPT | Performed by: ORTHOPAEDIC SURGERY

## 2020-11-24 PROCEDURE — 97112 NEUROMUSCULAR REEDUCATION: CPT | Performed by: PHYSICAL THERAPIST

## 2020-11-24 PROCEDURE — 97110 THERAPEUTIC EXERCISES: CPT | Performed by: PHYSICAL THERAPIST

## 2022-06-04 ENCOUNTER — OFFICE VISIT (OUTPATIENT)
Dept: URGENT CARE | Facility: CLINIC | Age: 27
End: 2022-06-04
Payer: COMMERCIAL

## 2022-06-04 VITALS
SYSTOLIC BLOOD PRESSURE: 117 MMHG | DIASTOLIC BLOOD PRESSURE: 69 MMHG | TEMPERATURE: 98.1 F | OXYGEN SATURATION: 98 % | WEIGHT: 175.4 LBS | RESPIRATION RATE: 20 BRPM | BODY MASS INDEX: 25.11 KG/M2 | HEART RATE: 67 BPM | HEIGHT: 70 IN

## 2022-06-04 DIAGNOSIS — U07.1 COVID-19: ICD-10-CM

## 2022-06-04 DIAGNOSIS — J20.9 ACUTE BRONCHITIS, UNSPECIFIED ORGANISM: Primary | ICD-10-CM

## 2022-06-04 PROCEDURE — 99213 OFFICE O/P EST LOW 20 MIN: CPT

## 2022-06-04 RX ORDER — BROMPHENIRAMINE MALEATE, PSEUDOEPHEDRINE HYDROCHLORIDE, AND DEXTROMETHORPHAN HYDROBROMIDE 2; 30; 10 MG/5ML; MG/5ML; MG/5ML
5 SYRUP ORAL 4 TIMES DAILY PRN
Qty: 120 ML | Refills: 0 | Status: SHIPPED | OUTPATIENT
Start: 2022-06-04

## 2022-06-04 RX ORDER — PREDNISONE 20 MG/1
40 TABLET ORAL DAILY
Qty: 10 TABLET | Refills: 0 | Status: SHIPPED | OUTPATIENT
Start: 2022-06-04 | End: 2022-06-09

## 2022-06-04 NOTE — PATIENT INSTRUCTIONS
Bronchitis typically resolves in 1-3 weeks without any medications  For cough continue warm fluids, OTC medications such as Mucinex, throat lozenges and any prescribed medications if given in office  Antibiotics are rarely needed  Follow up with PCP in 3-5 days  Proceed to  ER if symptoms worsen

## (undated) DEVICE — SYRINGE 20ML LL

## (undated) DEVICE — TUBING SUCTION 5MM X 12 FT

## (undated) DEVICE — SUT MONOCRYL 2-0 SH 27 IN Y417H

## (undated) DEVICE — GLOVE SRG BIOGEL 7.5

## (undated) DEVICE — COBAN 6 IN STERILE

## (undated) DEVICE — KIT DISP 3MM PLLA SHOULDER

## (undated) DEVICE — TUBING EXTENSION 6 FT CONTIN WAVE

## (undated) DEVICE — NEEDLE 18 G X 1 1/2

## (undated) DEVICE — THREADED CLEAR CANNULA WITH OBTURATOR 7MM X 75MM

## (undated) DEVICE — BURR  OVAL 4MM 13CM 8 FLUTE COOLCUT

## (undated) DEVICE — BETHLEHEM TOTAL HIP, KIT: Brand: CARDINAL HEALTH

## (undated) DEVICE — CHLORAPREP HI-LITE 26ML ORANGE

## (undated) DEVICE — SLEEVE SUSPENSION SHOULDER STAR LAT TRAC VELCRO STRAP

## (undated) DEVICE — BLADE SHAVER DISSECTOR 4MM 13CM COOLCUT

## (undated) DEVICE — SCD SEQUENTIAL COMPRESSION COMFORT SLEEVE MEDIUM KNEE LENGTH: Brand: KENDALL SCD

## (undated) DEVICE — IMPERVIOUS STOCKINETTE: Brand: DEROYAL

## (undated) DEVICE — PASSER SUT 25DEG CRV RT QUICKPASS LASSO

## (undated) DEVICE — PLUMEPEN PRO 10FT

## (undated) DEVICE — PUDDLE VAC

## (undated) DEVICE — CYSTO TUBING TUR Y IRRIGATION

## (undated) DEVICE — PASSER SUT 25DEG CRV LT QUICKPASS LASSO

## (undated) DEVICE — GLOVE SRG BIOGEL 8

## (undated) DEVICE — 3M™ STERI-STRIP™ REINFORCED ADHESIVE SKIN CLOSURES, R1547, 1/2 IN X 4 IN (12 MM X 100 MM), 6 STRIPS/ENVELOPE: Brand: 3M™ STERI-STRIP™

## (undated) DEVICE — 3M™ STERI-DRAPE™ U-DRAPE 1015: Brand: STERI-DRAPE™

## (undated) DEVICE — ANCHOR SUT KNOTLESS FIBERTAK 1.8: Type: IMPLANTABLE DEVICE | Site: SHOULDER | Status: NON-FUNCTIONAL

## (undated) DEVICE — KERLIX BANDAGE ROLL: Brand: KERLIX

## (undated) DEVICE — INTENDED FOR TISSUE SEPARATION, AND OTHER PROCEDURES THAT REQUIRE A SHARP SURGICAL BLADE TO PUNCTURE OR CUT.: Brand: BARD-PARKER ® CARBON RIB-BACK BLADES

## (undated) DEVICE — NEEDLE SPINAL18G X 3.5 IN QUINCKE

## (undated) DEVICE — GLOVE INDICATOR PI UNDERGLOVE SZ 8.5 BLUE

## (undated) DEVICE — ACE WRAP 4 IN UNSTERILE